# Patient Record
Sex: FEMALE | Race: WHITE | NOT HISPANIC OR LATINO | Employment: UNEMPLOYED | URBAN - METROPOLITAN AREA
[De-identification: names, ages, dates, MRNs, and addresses within clinical notes are randomized per-mention and may not be internally consistent; named-entity substitution may affect disease eponyms.]

---

## 2017-01-11 ENCOUNTER — OFFICE VISIT (OUTPATIENT)
Dept: HEMATOLOGY ONCOLOGY | Facility: MEDICAL CENTER | Age: 62
End: 2017-01-11
Payer: COMMERCIAL

## 2017-01-11 ENCOUNTER — HOSPITAL ENCOUNTER (OUTPATIENT)
Dept: RADIOLOGY | Facility: MEDICAL CENTER | Age: 62
End: 2017-01-11
Attending: INTERNAL MEDICINE
Payer: COMMERCIAL

## 2017-01-11 VITALS
RESPIRATION RATE: 16 BRPM | HEART RATE: 78 BPM | DIASTOLIC BLOOD PRESSURE: 78 MMHG | TEMPERATURE: 98.4 F | SYSTOLIC BLOOD PRESSURE: 140 MMHG | HEIGHT: 60 IN | OXYGEN SATURATION: 97 %

## 2017-01-11 DIAGNOSIS — R25.2 CRAMPS OF LEFT LOWER EXTREMITY: ICD-10-CM

## 2017-01-11 DIAGNOSIS — R23.2 HOT FLASHES: ICD-10-CM

## 2017-01-11 DIAGNOSIS — R79.89 ELEVATED LFTS: ICD-10-CM

## 2017-01-11 DIAGNOSIS — R25.2 CRAMPS OF RIGHT LOWER EXTREMITY: ICD-10-CM

## 2017-01-11 DIAGNOSIS — N60.99 ATYPICAL LOBULAR HYPERPLASIA OF BREAST: ICD-10-CM

## 2017-01-11 PROCEDURE — 99214 OFFICE O/P EST MOD 30 MIN: CPT | Performed by: INTERNAL MEDICINE

## 2017-01-11 PROCEDURE — 93971 EXTREMITY STUDY: CPT | Mod: RT

## 2017-01-11 RX ORDER — GABAPENTIN 100 MG/1
CAPSULE ORAL
Qty: 60 CAP | Refills: 1 | Status: SHIPPED | OUTPATIENT
Start: 2017-01-11 | End: 2017-09-12

## 2017-01-11 NOTE — PROGRESS NOTES
Follow Up Note:  Oncology    Date: 1/11/17    Time:  2:40 pm        Referring Physician: RANJANA Monique  Primary Care:  RANJANA Monique  Oncology: Dr. Hyatt   Surgery:     Diagnosis: left breast atypical lobular hyperplasia and flat epithelial atypia     Chief Complaint: She is here for a second opinion.    History of Presenting Illness:  Yesenia Huff is a 61 y.o. Female with left breast atypical lobular hyperplasia and flat epithelial A Blunt diagnosed secondary to abnormal mammogram. Imaging she was found to have changes in the left breast with faint calcifications as well as 2 oval mass in the posterior left breast. He was also found to have a 4 mm lesion in the right breast as well. She underwent biopsy of the calcifications in the left breast which was positive for a flat epithelial atypia with associated calcification and focal atypia lobular hyperplasia. Was originally seen by Dr. Wright who started on chemoprophylaxis with tamoxifen and 4/2016. She however was unable to tolerate tamoxifen and had multiple issues including nausea, vomiting, fatigue, hot flashes, GERD-type symptoms and increased bloating. She mentioned he stopped taking the tamoxifen in 8/2016. She had noticed improvement in her symptoms and she discontinued tamoxifen. She was then started on Arimidex in 9/2016. She appeared to have tolerated it slightly better however continue to have issues with nausea and vomiting bolus hot flashes. She had atypical symptoms such as blurred vision intermittent dizziness hair loss and increased fatigue as well as reflux symptoms. Mammogram in 11/2016 did not show any concerning findings. She established with me after a second opinion and she was changed to Aromasin to see if she would tolerate that better.    Interval history:  She is here for a follow-up visit at is accompanied by her daughter who was present in the room. He has been compliant with Aromasin since her last visit. She has been  feeling fairly stable however continues to have intermittent issues with her stomach. She states that she has early satiety as well as nausea and feels that her food is stuck in the stomach for a lung. A time. She also has intermittent issues with nausea secondary to this. This has been persistent and chronic. She denies any diarrhea and has been having intermittent constipation. She has noticed some aches and pains of her legs which have been a little worse since she started the Aromasin. Her hot lashes appears to be also getting a little worse. She however states that she couldn't tolerate this medication a low but better. He continues to have intermittent hair loss. She denies any fevers, chills or night sweats.    Past Medical History:  1. Breast typical lobular hyperplasia  2. Hypertension  3. Diabetes  4. History of hiatal hernia  5. Vitamin D deficiency  6. Insomnia  7. Hyperlipidemia  8. Arthritis  9. 2008 history of shingles        Allergies as of 01/11/2017 - Lc as Reviewed 01/11/2017   Allergen Reaction Noted   • Azithromycin  03/27/2014   • Ciprofloxacin hcl  03/29/2016   • Clindamycin  01/14/2014   • Codeine  01/14/2014   • Lyrica  01/14/2014   • Pcn [penicillins]  08/28/2013   • Valtrex [valacyclovir hcl]  01/14/2014   • Vicodin [hydrocodone-acetaminophen]  08/28/2013         Current outpatient prescriptions:   •  raloxifene (EVISTA) 60 MG Tab, Take 1 Tab by mouth every day., Disp: 30 Tab, Rfl: 3  •  anastrozole (ARIMIDEX) 1 MG Tab, Take 1 mg by mouth every day., Disp: , Rfl:   •  lisinopril (PRINIVIL) 20 MG Tab, TAKE ONE TABLET BY MOUTH AT BEDTIME, Disp: 90 Tab, Rfl: 1  •  metoprolol (LOPRESSOR) 25 MG Tab, Take 1 Tab by mouth 2 Times a Day., Disp: 60 Tab, Rfl: 11  •  vitamin D (CHOLECALCIFEROL) 1000 UNIT Tab, Take 1,000 Units by mouth every day., Disp: , Rfl:   •  omeprazole (PRILOSEC) 20 MG delayed-release capsule, Take 1 Cap by mouth 2 times a day., Disp: 180 Cap, Rfl: 3  •  dicyclomine (BENTYL)  10 MG Cap, Take 10 mg by mouth 4 Times a Day,Before Meals and at Bedtime., Disp: , Rfl:   •  lisinopril-hydrochlorothiazide (PRINZIDE, ZESTORETIC) 20-25 MG per tablet, TAKE ONE TABLET BY MOUTH DAILY, Disp: 90 Tab, Rfl: 3  •  aspirin 81 MG tablet, Take 4 Tabs by mouth every day., Disp: 100 Tab, Rfl: 3  •  ondansetron (ZOFRAN ODT) 8 MG TABLET DISPERSIBLE, Take 1 Tab by mouth every 8 hours as needed for Nausea/Vomiting., Disp: 30 Tab, Rfl: 0    Review of Systems:  All other review of systems are negative except what was mentioned above in the HPI.  Constitutional: Negative for fever, chills, and weight loss. Positive for malaise/fatigue.    HENT: Negative for ear pain and nosebleeds.     Eyes: Negative for blurred vision.    Respiratory: Positive for intermittent dry cough. Positive for intermittent shortness of breath.    Cardiovascular: Negative for chest pain and leg swelling.    Gastrointestinal: Postiive for heartburn. Positive for intermittent nausea. Negative for vomiting or abdominal pain.    Genitourinary: Negative for dysuria.    Musculoskeletal: Positive for myalgias, back pain and joint pain.    Skin: Negative for rash.    Neurological: Negative for dizziness, sensory change, focal weakness and headaches.    Endo/Heme/Allergies: No bruise/bleed easily.    Psychiatric/Behavioral: Negative for depression and memory loss.      Physical Exam:  Filed Vitals:    01/11/17 1449   BP: 140/78   Pulse: 78   Temp: 36.9 °C (98.4 °F)   Resp: 16   Height: 1.524 m (5')   SpO2: 97%       General: Not in acute distress, alert and oriented x 3  HEENT: normalcephalic, atraumatic, pupils equally reactive to light bilaterally, extra ocular muscles intact, moist oral mucus membranes, and oral cavity without any lesions.  Neck: Supple neck and full range of motion  Lymph nodes: No palpable bilateral cervical, supraclavicular, axillary or inguinal lymphadenopathy.    CVS: regular rate and rhythm, no rubs or gallops  RESP: Clear to  auscultate bilaterally.  Breast exam: Deferred   ABD: Soft, non tender, non distended, positive bowel sounds, no palpable hepatomegaly   EXT: No edema  CNS: Alert and oriented x3, cranial nerves grossly intact, muscle strength grossly intact, and normal gait.     Labs:  No visits with results within 1 Day(s) from this visit.  Latest known visit with results is:    Hospital Outpatient Visit on 12/21/2016   Component Date Value Ref Range Status   • WBC 12/21/2016 5.5  4.8 - 10.8 K/uL Final   • RBC 12/21/2016 4.46  4.20 - 5.40 M/uL Final   • Hemoglobin 12/21/2016 12.4  12.0 - 16.0 g/dL Final   • Hematocrit 12/21/2016 38.8  37.0 - 47.0 % Final   • MCV 12/21/2016 87.0  81.4 - 97.8 fL Final   • MCH 12/21/2016 27.8  27.0 - 33.0 pg Final   • MCHC 12/21/2016 32.0* 33.6 - 35.0 g/dL Final   • RDW 12/21/2016 42.3  35.9 - 50.0 fL Final   • Platelet Count 12/21/2016 174  164 - 446 K/uL Final   • MPV 12/21/2016 10.5  9.0 - 12.9 fL Final   • Neutrophils-Polys 12/21/2016 61.50  44.00 - 72.00 % Final   • Lymphocytes 12/21/2016 32.10  22.00 - 41.00 % Final   • Monocytes 12/21/2016 4.20  0.00 - 13.40 % Final   • Eosinophils 12/21/2016 1.30  0.00 - 6.90 % Final   • Basophils 12/21/2016 0.50  0.00 - 1.80 % Final   • Immature Granulocytes 12/21/2016 0.40  0.00 - 0.90 % Final   • Nucleated RBC 12/21/2016 0.00   Final   • Neutrophils (Absolute) 12/21/2016 3.38  2.00 - 7.15 K/uL Final    Includes immature neutrophils, if present.   • Lymphs (Absolute) 12/21/2016 1.76  1.00 - 4.80 K/uL Final   • Monos (Absolute) 12/21/2016 0.23  0.00 - 0.85 K/uL Final   • Eos (Absolute) 12/21/2016 0.07  0.00 - 0.51 K/uL Final   • Baso (Absolute) 12/21/2016 0.03  0.00 - 0.12 K/uL Final   • Immature Granulocytes (abs) 12/21/2016 0.02  0.00 - 0.11 K/uL Final   • NRBC (Absolute) 12/21/2016 0.00   Final   • Sodium 12/21/2016 137  135 - 145 mmol/L Final   • Potassium 12/21/2016 3.8  3.6 - 5.5 mmol/L Final   • Chloride 12/21/2016 100  96 - 112 mmol/L Final   • Co2  12/21/2016 28  20 - 33 mmol/L Final   • Anion Gap 12/21/2016 9.0  0.0 - 11.9 Final   • Glucose 12/21/2016 93  65 - 99 mg/dL Final   • Bun 12/21/2016 12  8 - 22 mg/dL Final   • Creatinine 12/21/2016 0.73  0.50 - 1.40 mg/dL Final   • Calcium 12/21/2016 10.2  8.5 - 10.5 mg/dL Final   • AST(SGOT) 12/21/2016 62* 12 - 45 U/L Final   • ALT(SGPT) 12/21/2016 48  2 - 50 U/L Final   • Alkaline Phosphatase 12/21/2016 64  30 - 99 U/L Final   • Total Bilirubin 12/21/2016 0.8  0.1 - 1.5 mg/dL Final   • Albumin 12/21/2016 4.4  3.2 - 4.9 g/dL Final   • Total Protein 12/21/2016 8.1  6.0 - 8.2 g/dL Final   • Globulin 12/21/2016 3.7* 1.9 - 3.5 g/dL Final   • A-G Ratio 12/21/2016 1.2   Final   • GFR If  12/21/2016 >60  >60 mL/min/1.73 m 2 Final   • GFR If Non  12/21/2016 >60  >60 mL/min/1.73 m 2 Final   ]    Assessment & Plan:    1. Left breast atypical lobular hyperplasia and flat epithelial atypia: He was diagnosed secondary to abnormal mammogram which showed small abnormalities in the left breast mostly in a small spot in the right breast. Biopsy was consistent with atypical lobular hyperplasia and flat epithelial atypia without any evidence of invasive malignancy or in situ malignancy. She has been on chemoprophylaxis. She was initially unable to tolerate tamoxifen. She was then started on Arimidex which she tolerated better however continued to have issues. Most recently she has been started on Aromasin which appears to be tolerating a little bit better. She continues to have some of these atypical side effects of unclear etiology. Patient states that her symptoms are fairly manageable and she would like to continue Aromasin.    2. Hot flashes: She continues to have significant issues with hot flashes which have worsened with Aromasin. I went over option of Effexor versus gabapentin. She is agreeable to gabapentin hence I will start her on low dose with slow escalation.    3. Reflux and early  satiety: On further discussion with the patient and she appears to have symptoms concerning for gastroparesis likely from her diabetes. She is due to see her primary care physician shortly. I recommend further discussion about this and possible trial of Reglan to help with her symptoms.    3. Elevated liver enzymes: She continues to have persistent mild elevation of her liver enzymes of unclear etiology. She was tested for hepatitis and was negative in the past. Patient had an ultrasound done earlier 2016. Unclear etiology for elevated liver enzymes. I recommend repeat ultrasound of the abdomen for reassessment.    4. Left calf pain: Patient has noticed intermittent calf pain mostly in the left side which is mostly at nighttime. She has also noticed episodes of tenderness and pain in the left calf. I will order an ultrasound of the left leg.    5. I will continue to monitor closely. She is to follow-up again in 2 months or sooner as needed.    She agreed and verbalized her agreement and understanding with the current plan. I answered all questions and concerns she has at this time and advised her to call at any time in the interim with questions or concerns in regards to her care. Thank you for allowing me to participate in her care, I will continue to follow.    Please note that this dictation was created using voice recognition software. I have made every reasonable attempt to correct obvious errors, but I expect that there are errors of grammar and possibly content that I did not discover before finalizing the note.

## 2017-01-11 NOTE — Clinical Note
[facility]    [address], [address]  [place] [address]        Date:1/11/2017                     Reference to Yesenia Huff    Follow Up Note:  Oncology    Date: 1/11/17    Time:  2:40 pm        Referring Physician: RANJANA Monique  Primary Care:  RANJANA Monique  Oncology: Dr. Hyatt   Surgery:     Diagnosis: left breast atypical lobular hyperplasia and flat epithelial atypia     Chief Complaint: She is here for a second opinion.    History of Presenting Illness:  Yesenia Huff is a 61 y.o. Female with left breast atypical lobular hyperplasia and flat epithelial A Blunt diagnosed secondary to abnormal mammogram. Imaging she was found to have changes in the left breast with faint calcifications as well as 2 oval mass in the posterior left breast. He was also found to have a 4 mm lesion in the right breast as well. She underwent biopsy of the calcifications in the left breast which was positive for a flat epithelial atypia with associated calcification and focal atypia lobular hyperplasia. Was originally seen by Dr. Wright who started on chemoprophylaxis with tamoxifen and 4/2016. She however was unable to tolerate tamoxifen and had multiple issues including nausea, vomiting, fatigue, hot flashes, GERD-type symptoms and increased bloating. She mentioned he stopped taking the tamoxifen in 8/2016. She had noticed improvement in her symptoms and she discontinued tamoxifen. She was then started on Arimidex in 9/2016. She appeared to have tolerated it slightly better however continue to have issues with nausea and vomiting bolus hot flashes. She had atypical symptoms such as blurred vision intermittent dizziness hair loss and increased fatigue as well as reflux symptoms. Mammogram in 11/2016 did not show any concerning findings. She established with me after a second opinion and she was changed to Aromasin to see if she would tolerate that better.    Interval history:  She  is here for a follow-up visit at is accompanied by her daughter who was present in the room. He has been compliant with Aromasin since her last visit. She has been feeling fairly stable however continues to have intermittent issues with her stomach. She states that she has early satiety as well as nausea and feels that her food is stuck in the stomach for a lung. A time. She also has intermittent issues with nausea secondary to this. This has been persistent and chronic. She denies any diarrhea and has been having intermittent constipation. She has noticed some aches and pains of her legs which have been a little worse since she started the Aromasin. Her hot lashes appears to be also getting a little worse. She however states that she couldn't tolerate this medication a low but better. He continues to have intermittent hair loss. She denies any fevers, chills or night sweats.    Past Medical History:  1. Breast typical lobular hyperplasia  2. Hypertension  3. Diabetes  4. History of hiatal hernia  5. Vitamin D deficiency  6. Insomnia  7. Hyperlipidemia  8. Arthritis  9. 2008 history of shingles        Allergies as of 01/11/2017 - Lc as Reviewed 01/11/2017   Allergen Reaction Noted   • Azithromycin  03/27/2014   • Ciprofloxacin hcl  03/29/2016   • Clindamycin  01/14/2014   • Codeine  01/14/2014   • Lyrica  01/14/2014   • Pcn [penicillins]  08/28/2013   • Valtrex [valacyclovir hcl]  01/14/2014   • Vicodin [hydrocodone-acetaminophen]  08/28/2013         Current outpatient prescriptions:   •  raloxifene (EVISTA) 60 MG Tab, Take 1 Tab by mouth every day., Disp: 30 Tab, Rfl: 3  •  anastrozole (ARIMIDEX) 1 MG Tab, Take 1 mg by mouth every day., Disp: , Rfl:   •  lisinopril (PRINIVIL) 20 MG Tab, TAKE ONE TABLET BY MOUTH AT BEDTIME, Disp: 90 Tab, Rfl: 1  •  metoprolol (LOPRESSOR) 25 MG Tab, Take 1 Tab by mouth 2 Times a Day., Disp: 60 Tab, Rfl: 11  •  vitamin D (CHOLECALCIFEROL) 1000 UNIT Tab, Take 1,000 Units by mouth  every day., Disp: , Rfl:   •  omeprazole (PRILOSEC) 20 MG delayed-release capsule, Take 1 Cap by mouth 2 times a day., Disp: 180 Cap, Rfl: 3  •  dicyclomine (BENTYL) 10 MG Cap, Take 10 mg by mouth 4 Times a Day,Before Meals and at Bedtime., Disp: , Rfl:   •  lisinopril-hydrochlorothiazide (PRINZIDE, ZESTORETIC) 20-25 MG per tablet, TAKE ONE TABLET BY MOUTH DAILY, Disp: 90 Tab, Rfl: 3  •  aspirin 81 MG tablet, Take 4 Tabs by mouth every day., Disp: 100 Tab, Rfl: 3  •  ondansetron (ZOFRAN ODT) 8 MG TABLET DISPERSIBLE, Take 1 Tab by mouth every 8 hours as needed for Nausea/Vomiting., Disp: 30 Tab, Rfl: 0    Review of Systems:  All other review of systems are negative except what was mentioned above in the HPI.  Constitutional: Negative for fever, chills, and weight loss. Positive for malaise/fatigue.    HENT: Negative for ear pain and nosebleeds.     Eyes: Negative for blurred vision.    Respiratory: Positive for intermittent dry cough. Positive for intermittent shortness of breath.    Cardiovascular: Negative for chest pain and leg swelling.    Gastrointestinal: Postiive for heartburn. Positive for intermittent nausea. Negative for vomiting or abdominal pain.    Genitourinary: Negative for dysuria.    Musculoskeletal: Positive for myalgias, back pain and joint pain.    Skin: Negative for rash.    Neurological: Negative for dizziness, sensory change, focal weakness and headaches.    Endo/Heme/Allergies: No bruise/bleed easily.    Psychiatric/Behavioral: Negative for depression and memory loss.      Physical Exam:  Filed Vitals:    01/11/17 1449   BP: 140/78   Pulse: 78   Temp: 36.9 °C (98.4 °F)   Resp: 16   Height: 1.524 m (5')   SpO2: 97%       General: Not in acute distress, alert and oriented x 3  HEENT: normalcephalic, atraumatic, pupils equally reactive to light bilaterally, extra ocular muscles intact, moist oral mucus membranes, and oral cavity without any lesions.  Neck: Supple neck and full range of  motion  Lymph nodes: No palpable bilateral cervical, supraclavicular, axillary or inguinal lymphadenopathy.    CVS: regular rate and rhythm, no rubs or gallops  RESP: Clear to auscultate bilaterally.  Breast exam: Deferred   ABD: Soft, non tender, non distended, positive bowel sounds, no palpable hepatomegaly   EXT: No edema  CNS: Alert and oriented x3, cranial nerves grossly intact, muscle strength grossly intact, and normal gait.     Labs:  No visits with results within 1 Day(s) from this visit.  Latest known visit with results is:    Hospital Outpatient Visit on 12/21/2016   Component Date Value Ref Range Status   • WBC 12/21/2016 5.5  4.8 - 10.8 K/uL Final   • RBC 12/21/2016 4.46  4.20 - 5.40 M/uL Final   • Hemoglobin 12/21/2016 12.4  12.0 - 16.0 g/dL Final   • Hematocrit 12/21/2016 38.8  37.0 - 47.0 % Final   • MCV 12/21/2016 87.0  81.4 - 97.8 fL Final   • MCH 12/21/2016 27.8  27.0 - 33.0 pg Final   • MCHC 12/21/2016 32.0* 33.6 - 35.0 g/dL Final   • RDW 12/21/2016 42.3  35.9 - 50.0 fL Final   • Platelet Count 12/21/2016 174  164 - 446 K/uL Final   • MPV 12/21/2016 10.5  9.0 - 12.9 fL Final   • Neutrophils-Polys 12/21/2016 61.50  44.00 - 72.00 % Final   • Lymphocytes 12/21/2016 32.10  22.00 - 41.00 % Final   • Monocytes 12/21/2016 4.20  0.00 - 13.40 % Final   • Eosinophils 12/21/2016 1.30  0.00 - 6.90 % Final   • Basophils 12/21/2016 0.50  0.00 - 1.80 % Final   • Immature Granulocytes 12/21/2016 0.40  0.00 - 0.90 % Final   • Nucleated RBC 12/21/2016 0.00   Final   • Neutrophils (Absolute) 12/21/2016 3.38  2.00 - 7.15 K/uL Final    Includes immature neutrophils, if present.   • Lymphs (Absolute) 12/21/2016 1.76  1.00 - 4.80 K/uL Final   • Monos (Absolute) 12/21/2016 0.23  0.00 - 0.85 K/uL Final   • Eos (Absolute) 12/21/2016 0.07  0.00 - 0.51 K/uL Final   • Baso (Absolute) 12/21/2016 0.03  0.00 - 0.12 K/uL Final   • Immature Granulocytes (abs) 12/21/2016 0.02  0.00 - 0.11 K/uL Final   • NRBC (Absolute) 12/21/2016  0.00   Final   • Sodium 12/21/2016 137  135 - 145 mmol/L Final   • Potassium 12/21/2016 3.8  3.6 - 5.5 mmol/L Final   • Chloride 12/21/2016 100  96 - 112 mmol/L Final   • Co2 12/21/2016 28  20 - 33 mmol/L Final   • Anion Gap 12/21/2016 9.0  0.0 - 11.9 Final   • Glucose 12/21/2016 93  65 - 99 mg/dL Final   • Bun 12/21/2016 12  8 - 22 mg/dL Final   • Creatinine 12/21/2016 0.73  0.50 - 1.40 mg/dL Final   • Calcium 12/21/2016 10.2  8.5 - 10.5 mg/dL Final   • AST(SGOT) 12/21/2016 62* 12 - 45 U/L Final   • ALT(SGPT) 12/21/2016 48  2 - 50 U/L Final   • Alkaline Phosphatase 12/21/2016 64  30 - 99 U/L Final   • Total Bilirubin 12/21/2016 0.8  0.1 - 1.5 mg/dL Final   • Albumin 12/21/2016 4.4  3.2 - 4.9 g/dL Final   • Total Protein 12/21/2016 8.1  6.0 - 8.2 g/dL Final   • Globulin 12/21/2016 3.7* 1.9 - 3.5 g/dL Final   • A-G Ratio 12/21/2016 1.2   Final   • GFR If  12/21/2016 >60  >60 mL/min/1.73 m 2 Final   • GFR If Non  12/21/2016 >60  >60 mL/min/1.73 m 2 Final   ]    Assessment & Plan:    1. Left breast atypical lobular hyperplasia and flat epithelial atypia: He was diagnosed secondary to abnormal mammogram which showed small abnormalities in the left breast mostly in a small spot in the right breast. Biopsy was consistent with atypical lobular hyperplasia and flat epithelial atypia without any evidence of invasive malignancy or in situ malignancy. She has been on chemoprophylaxis. She was initially unable to tolerate tamoxifen. She was then started on Arimidex which she tolerated better however continued to have issues. Most recently she has been started on Aromasin which appears to be tolerating a little bit better. She continues to have some of these atypical side effects of unclear etiology. Patient states that her symptoms are fairly manageable and she would like to continue Aromasin.    2. Hot flashes: She continues to have significant issues with hot flashes which have worsened with  Aromasin. I went over option of Effexor versus gabapentin. She is agreeable to gabapentin hence I will start her on low dose with slow escalation.    3. Reflux and early satiety: On further discussion with the patient and she appears to have symptoms concerning for gastroparesis likely from her diabetes. She is due to see her primary care physician shortly. I recommend further discussion about this and possible trial of Reglan to help with her symptoms.    3. Elevated liver enzymes: She continues to have persistent mild elevation of her liver enzymes of unclear etiology. She was tested for hepatitis and was negative in the past. Patient had an ultrasound done earlier 2016. Unclear etiology for elevated liver enzymes. I recommend repeat ultrasound of the abdomen for reassessment.    4. Left calf pain: Patient has noticed intermittent calf pain mostly in the left side which is mostly at nighttime. She has also noticed episodes of tenderness and pain in the left calf. I will order an ultrasound of the left leg.    5. I will continue to monitor closely. She is to follow-up again in 2 months or sooner as needed.    She agreed and verbalized her agreement and understanding with the current plan. I answered all questions and concerns she has at this time and advised her to call at any time in the interim with questions or concerns in regards to her care. Thank you for allowing me to participate in her care, I will continue to follow.    Please note that this dictation was created using voice recognition software. I have made every reasonable attempt to correct obvious errors, but I expect that there are errors of grammar and possibly content that I did not discover before finalizing the note.            Sincerely,  Ailyn Cruz  [address] [place] [address], [address]   [phone]

## 2017-01-11 NOTE — MR AVS SNAPSHOT
Yesenia Huff   2017 2:40 PM   Office Visit   MRN: [id number]    Department:  [facility]   Dept Phone:  [phone]    Description:  Female : 1955   Provider:  Ailyn Cruz M.D.           Reason for Visit     Follow-Up 4 week      Allergies as of 2017     Allergen Noted Reactions    Azithromycin 2014       Heart racing    Ciprofloxacin Hcl 2016       Clindamycin 2014       Codeine 2014       Lyrica 2014       Pcn [Penicillins] 2013       Valtrex [Valacyclovir Hcl] 2014       Vicodin [Hydrocodone-Acetaminophen] 2013         You were diagnosed with     Cramps of left lower extremity   [5539283]       Hot flashes   [951726]       Elevated LFTs   [484511]         Vital Signs     Blood Pressure Pulse Temperature Respirations Height       140/78 mmHg 78 36.9 °C (98.4 °F) 16 1.524 m (5')     Oxygen Saturation Smoking Status                97% Former Smoker          Basic Information     Date Of Birth Sex Race Ethnicity Preferred Language    1955 Female White Non- English      Your appointments     2017  5:00 PM   US EXTREMITY (30) A with VISTA [address]   [facility] ([place])    [address] [facility] [id number] [phone]            2017  3:40 PM   Established Patient with RANJANA Monique   [facility] (--)    [address] [id number] [phone]           You will be receiving a confirmation call a few days before your appointment from our automated call confirmation system.            Mar 08, 2017 10:40 AM   ONCOLOGY EST PATIENT 30 MIN with Ailyn Cruz M.D.   [facility] (--)    [address] [facility], [address]  [facility] [id number] [phone]            2017  9:20 AM   Established Patient with RANJANA Monique   [facility] (--)    [address] [id number] [phone]          You will be receiving a confirmation call a few days before your appointment from our automated call confirmation system.              Problem List              ICD-10-CM Priority Class Noted - Resolved    HTN (hypertension) I10   10/26/2010 - Present    Tobacco abuse Z72.0   10/22/2013 - Present    Atypical chest pain R07.89   3/27/2014 - Present    Dyspnea R06.00   3/27/2014 - Present    Gastroesophageal reflux disease without esophagitis K21.9   10/29/2015 - Present    IBS (irritable bowel syndrome) K58.9   10/29/2015 - Present    Hiatal hernia K44.9   10/29/2015 - Present    Hyperlipidemia E78.5   10/29/2015 - Present    Elevated liver enzymes R74.8   11/18/2015 - Present    Vitamin D deficiency E55.9   11/18/2015 - Present    Scalp cyst L72.9   2/18/2016 - Present    Ankle pain M25.579   2/18/2016 - Present    Skin lesion L98.9   2/18/2016 - Present    Trigger finger of right hand M65.30   3/16/2016 - Present    Tendinitis M77.9   3/16/2016 - Present    Abnormal mammogram R92.8   3/16/2016 - Present    Depression F32.9   5/19/2016 - Present    Malignant neoplasm of left female breast (HCC) C50.912   10/4/2016 - Present    Elevated TSH R94.6   10/4/2016 - Present    Lobular hyperplasia, atypical, breast N62   12/21/2016 - Present      Health Maintenance        Date Due Completion Dates    IMM DTaP/Tdap/Td Vaccine (1 - Tdap) 4/23/1976 ---    PAP SMEAR 8/28/2016 8/28/2013 (Declined)    Override on 8/28/2013: Patient Declined    IMM INFLUENZA (1) 9/1/2016 10/22/2013    MAMMOGRAM 3/9/2017 3/9/2016    COLONOSCOPY 8/28/2023 8/28/2013 (Declined)    Override on 8/28/2013: Patient Declined            Current Immunizations     Influenza TIV (IM) 10/22/2013    Pneumococcal polysaccharide vaccine (PPSV-23) 3/27/2014 10:23 PM      Below and/or attached are the medications your provider expects you to take. Review all of your home medications and newly ordered medications with your provider and/or pharmacist. Follow  medication instructions as directed by your provider and/or pharmacist. Please keep your medication list with you and share with your provider. Update the information when medications are discontinued, doses are changed, or new medications (including over-the-counter products) are added; and carry medication information at all times in the event of emergency situations     Allergies:  AZITHROMYCIN - (reactions not documented)     CIPROFLOXACIN HCL - (reactions not documented)     CLINDAMYCIN - (reactions not documented)     CODEINE - (reactions not documented)     LYRICA - (reactions not documented)     PCN - (reactions not documented)     VALTREX - (reactions not documented)     VICODIN - (reactions not documented)               Medications  Valid as of: January 11, 2017 -  3:45 PM    Generic Name Brand Name Tablet Size Instructions for use    Aspirin (Tab) aspirin 81 MG Take 4 Tabs by mouth every day.        Cholecalciferol (Tab) cholecalciferol 1000 UNIT Take 1,000 Units by mouth every day.        Dicyclomine HCl (Cap) BENTYL 10 MG Take 10 mg by mouth 4 Times a Day,Before Meals and at Bedtime.        Gabapentin (Cap) NEURONTIN 100 MG Take one tab qhs x 2 days, then 2 tabs qhs and then 3 tab qhs        Lisinopril (Tab) PRINIVIL 20 MG TAKE ONE TABLET BY MOUTH AT BEDTIME        Lisinopril-Hydrochlorothiazide (Tab) PRINZIDE, ZESTORETIC 20-25 MG TAKE ONE TABLET BY MOUTH DAILY        Metoprolol Tartrate (Tab) LOPRESSOR 25 MG Take 1 Tab by mouth 2 Times a Day.        Omeprazole (CAPSULE DELAYED RELEASE) PRILOSEC 20 MG Take 1 Cap by mouth 2 times a day.        Ondansetron (TABLET DISPERSIBLE) ZOFRAN ODT 8 MG Take 1 Tab by mouth every 8 hours as needed for Nausea/Vomiting.        Raloxifene HCl (Tab) EVISTA 60 MG Take 1 Tab by mouth every day.        .                 Medicines prescribed today were sent to:     [facility][id number] - [place] - [address]    [address] [place] [address]    Phone: [phone]  Fax: [phone]    Open 24 Hours?: No      Medication refill instructions:       If your prescription bottle indicates you have medication refills left, it is not necessary to call your provider’s office. Please contact your pharmacy and they will refill your medication.    If your prescription bottle indicates you do not have any refills left, you may request refills at any time through one of the following ways: The online [employer] system (except Urgent Care), by calling your provider’s office, or by asking your pharmacy to contact your provider’s office with a refill request. Medication refills are processed only during regular business hours and may not be available until the next business day. Your provider may request additional information or to have a follow-up visit with you prior to refilling your medication.   *Please Note: Medication refills are assigned a new Rx number when refilled electronically. Your pharmacy may indicate that no refills were authorized even though a new prescription for the same medication is available at the pharmacy. Please request the medicine by name with the pharmacy before contacting your provider for a refill.        Your To Do List     Future Labs/Procedures Complete By Expires    US-ABDOMEN COMPLETE SURVEY  As directed 1/11/2018         [employer] Access Code: Activation code not generated  Current [employer] Status: Active

## 2017-01-12 ENCOUNTER — OFFICE VISIT (OUTPATIENT)
Dept: MEDICAL GROUP | Facility: CLINIC | Age: 62
End: 2017-01-12
Payer: COMMERCIAL

## 2017-01-12 VITALS
HEIGHT: 60 IN | OXYGEN SATURATION: 97 % | RESPIRATION RATE: 16 BRPM | SYSTOLIC BLOOD PRESSURE: 122 MMHG | HEART RATE: 82 BPM | DIASTOLIC BLOOD PRESSURE: 80 MMHG | TEMPERATURE: 97.9 F

## 2017-01-12 DIAGNOSIS — I10 ESSENTIAL HYPERTENSION: ICD-10-CM

## 2017-01-12 DIAGNOSIS — K31.84 GASTROPARESIS: ICD-10-CM

## 2017-01-12 PROCEDURE — 99214 OFFICE O/P EST MOD 30 MIN: CPT | Performed by: NURSE PRACTITIONER

## 2017-01-12 RX ORDER — METOCLOPRAMIDE 5 MG/1
5 TABLET ORAL
Qty: 120 TAB | Refills: 2 | Status: SHIPPED | OUTPATIENT
Start: 2017-01-12 | End: 2018-06-01

## 2017-01-12 NOTE — ASSESSMENT & PLAN NOTE
This is a chronic problem that is controlled with her current medications. Patient denies any headache, dizziness, chest pain, shortness of breath, or lower extremity edema.

## 2017-01-12 NOTE — MR AVS SNAPSHOT
Yesenia Huff   2017 3:40 PM   Office Visit   MRN: [id number]    [facility] Phone:  [phone]    Description:  Female : 1955   Provider:  RANJANA Monique           Reason for Visit     Follow-Up 3 months    Results labs      Allergies as of 2017     Allergen Noted Reactions    Azithromycin 2014       Heart racing    Ciprofloxacin Hcl 2016       Clindamycin 2014       Codeine 2014       Lyrica 2014       Pcn [Penicillins] 2013       Valtrex [Valacyclovir Hcl] 2014       Vicodin [Hydrocodone-Acetaminophen] 2013         You were diagnosed with     Gastroparesis   [536.3.ICD-9-CM]         Vital Signs     Blood Pressure Pulse Temperature Respirations Height       122/80 mmHg 82 36.6 °C (97.9 °F) 16 1.524 m (5')     Oxygen Saturation Smoking Status                97% Former Smoker          Basic Information     Date Of Birth Sex Race Ethnicity Preferred Language    1955 Female White Non- English      Your appointments     2017  3:40 PM   Established Patient with RANJANA Monique   [facility] (--)    [address] [place] [id number] [phone]           You will be receiving a confirmation call a few days before your appointment from our automated call confirmation system.            Mar 08, 2017 10:40 AM   ONCOLOGY EST PATIENT 30 MIN with Ailyn Cruz M.D.   [facility] (--)    [address] [place] [phone]   [phone]            2017  9:20 AM   Established Patient with RANJANA Monique   [facility] (--)    [address] [place] [id number] [phone]           You will be receiving a confirmation call a few days before your appointment from our automated call confirmation system.              Problem List              ICD-10-CM Priority Class Noted -  Resolved    HTN (hypertension) I10   10/26/2010 - Present    Tobacco abuse Z72.0   10/22/2013 - Present    Atypical chest pain R07.89   3/27/2014 - Present    Dyspnea R06.00   3/27/2014 - Present    Gastroesophageal reflux disease without esophagitis K21.9   10/29/2015 - Present    IBS (irritable bowel syndrome) K58.9   10/29/2015 - Present    Hiatal hernia K44.9   10/29/2015 - Present    Hyperlipidemia E78.5   10/29/2015 - Present    Elevated liver enzymes R74.8   11/18/2015 - Present    Vitamin D deficiency E55.9   11/18/2015 - Present    Scalp cyst L72.9   2/18/2016 - Present    Ankle pain M25.579   2/18/2016 - Present    Skin lesion L98.9   2/18/2016 - Present    Trigger finger of right hand M65.30   3/16/2016 - Present    Tendinitis M77.9   3/16/2016 - Present    Abnormal mammogram R92.8   3/16/2016 - Present    Depression F32.9   5/19/2016 - Present    Elevated TSH R94.6   10/4/2016 - Present    Lobular hyperplasia, atypical, breast N62   12/21/2016 - Present    Atypical lobular hyperplasia of breast N62   1/11/2017 - Present    Gastroparesis K31.84   1/12/2017 - Present      Health Maintenance        Date Due Completion Dates    IMM DTaP/Tdap/Td Vaccine (1 - Tdap) 4/23/1976 ---    PAP SMEAR 8/28/2016 8/28/2013 (Declined)    Override on 8/28/2013: Patient Declined    IMM INFLUENZA (1) 9/1/2016 10/22/2013    MAMMOGRAM 3/9/2017 3/9/2016    COLONOSCOPY 8/28/2023 8/28/2013 (Declined)    Override on 8/28/2013: Patient Declined            Current Immunizations     Influenza TIV (IM) 10/22/2013    Pneumococcal polysaccharide vaccine (PPSV-23) 3/27/2014 10:23 PM      Below and/or attached are the medications your provider expects you to take. Review all of your home medications and newly ordered medications with your provider and/or pharmacist. Follow medication instructions as directed by your provider and/or pharmacist. Please keep your medication list with you and share with your provider. Update the information  when medications are discontinued, doses are changed, or new medications (including over-the-counter products) are added; and carry medication information at all times in the event of emergency situations     Allergies:  AZITHROMYCIN - (reactions not documented)     CIPROFLOXACIN HCL - (reactions not documented)     CLINDAMYCIN - (reactions not documented)     CODEINE - (reactions not documented)     LYRICA - (reactions not documented)     PCN - (reactions not documented)     VALTREX - (reactions not documented)     VICODIN - (reactions not documented)               Medications  Valid as of: January 12, 2017 - 12:14 PM    Generic Name Brand Name Tablet Size Instructions for use    Aspirin (Tab) aspirin 81 MG Take 4 Tabs by mouth every day.        Calcium Carbonate Antacid   Take  by mouth.        Cholecalciferol (Tab) cholecalciferol 1000 UNIT Take 1,000 Units by mouth every day.        Dicyclomine HCl (Cap) BENTYL 10 MG Take 10 mg by mouth 4 Times a Day,Before Meals and at Bedtime.        DiphenhydrAMINE HCl (Sleep)   Take  by mouth.        Gabapentin (Cap) NEURONTIN 100 MG Take one tab qhs x 2 days, then 2 tabs qhs and then 3 tab qhs        Lisinopril (Tab) PRINIVIL 20 MG TAKE ONE TABLET BY MOUTH AT BEDTIME        Lisinopril-Hydrochlorothiazide (Tab) PRINZIDE, ZESTORETIC 20-25 MG TAKE ONE TABLET BY MOUTH DAILY        Metoclopramide HCl (Tab) REGLAN 5 MG Take 1 Tab by mouth 3 times a day before meals.        Metoprolol Tartrate (Tab) LOPRESSOR 25 MG Take 1 Tab by mouth 2 Times a Day.        Omeprazole (CAPSULE DELAYED RELEASE) PRILOSEC 20 MG Take 1 Cap by mouth 2 times a day.        Ondansetron (TABLET DISPERSIBLE) ZOFRAN ODT 8 MG Take 1 Tab by mouth every 8 hours as needed for Nausea/Vomiting.        Raloxifene HCl (Tab) EVISTA 60 MG Take 1 Tab by mouth every day.        .                 Medicines prescribed today were sent to:     [facility] #[id number] - [place] - [address]    [address] [place]  [address]    Phone: [phone] Fax: [phone]    Open 24 Hours?: No      Medication refill instructions:       If your prescription bottle indicates you have medication refills left, it is not necessary to call your provider’s office. Please contact your pharmacy and they will refill your medication.    If your prescription bottle indicates you do not have any refills left, you may request refills at any time through one of the following ways: The online [employer] system (except Urgent Care), by calling your provider’s office, or by asking your pharmacy to contact your provider’s office with a refill request. Medication refills are processed only during regular business hours and may not be available until the next business day. Your provider may request additional information or to have a follow-up visit with you prior to refilling your medication.   *Please Note: Medication refills are assigned a new Rx number when refilled electronically. Your pharmacy may indicate that no refills were authorized even though a new prescription for the same medication is available at the pharmacy. Please request the medicine by name with the pharmacy before contacting your provider for a refill.           [employer] Access Code: Activation code not generated  Current [employer] Status: Active

## 2017-01-12 NOTE — PROGRESS NOTES
"  Chief Complaint   Patient presents with   • Follow-Up     3 months   • Results     labs       HISTORY OF PRESENT ILLNESS: Patient is a 61 y.o. female established patient who presents today to review her recent lab results and to discuss her health concerns as outlined below.      Gastroparesis  This is a new problem. Patient has been experiencing nausea and feelings of \"fullness\" after she eats. She occasionally uses Zofran for her nausea and states this medication works well. I will start her on Reglan to see if this helps with her symptoms. Patient was instructed to eat small portions and to monitor for worsening symptoms such as fever or intense abdominal pain.    HTN (hypertension)  This is a chronic problem that is controlled with her current medications. Patient denies any headache, dizziness, chest pain, shortness of breath, or lower extremity edema.        Patient Active Problem List    Diagnosis Date Noted   • Gastroparesis 01/12/2017   • Atypical lobular hyperplasia of breast 01/11/2017   • Lobular hyperplasia, atypical, breast 12/21/2016   • Elevated TSH 10/04/2016   • Depression 05/19/2016   • Trigger finger of right hand 03/16/2016   • Tendinitis 03/16/2016   • Abnormal mammogram 03/16/2016   • Scalp cyst 02/18/2016   • Ankle pain 02/18/2016   • Skin lesion 02/18/2016   • Elevated liver enzymes 11/18/2015   • Vitamin D deficiency 11/18/2015   • Gastroesophageal reflux disease without esophagitis 10/29/2015   • IBS (irritable bowel syndrome) 10/29/2015   • Hiatal hernia 10/29/2015   • Hyperlipidemia 10/29/2015   • Atypical chest pain 03/27/2014   • Dyspnea 03/27/2014   • HTN (hypertension) 10/26/2010       Allergies:Azithromycin; Ciprofloxacin hcl; Clindamycin; Codeine; Lyrica; Pcn; Valtrex; and Vicodin    Current Outpatient Prescriptions   Medication Sig Dispense Refill   • DiphenhydrAMINE HCl, Sleep, (UNISOM SLEEPGELS PO) Take  by mouth.     • Calcium Carbonate Antacid (DILAN-SELTZER ANTACID PO) Take  " by mouth.     • metoclopramide (REGLAN) 5 MG tablet Take 1 Tab by mouth 3 times a day before meals. 120 Tab 2   • raloxifene (EVISTA) 60 MG Tab Take 1 Tab by mouth every day. 30 Tab 3   • lisinopril (PRINIVIL) 20 MG Tab TAKE ONE TABLET BY MOUTH AT BEDTIME 90 Tab 1   • metoprolol (LOPRESSOR) 25 MG Tab Take 1 Tab by mouth 2 Times a Day. 60 Tab 11   • vitamin D (CHOLECALCIFEROL) 1000 UNIT Tab Take 1,000 Units by mouth every day.     • omeprazole (PRILOSEC) 20 MG delayed-release capsule Take 1 Cap by mouth 2 times a day. 180 Cap 3   • lisinopril-hydrochlorothiazide (PRINZIDE, ZESTORETIC) 20-25 MG per tablet TAKE ONE TABLET BY MOUTH DAILY 90 Tab 3   • aspirin 81 MG tablet Take 4 Tabs by mouth every day. 100 Tab 3   • gabapentin (NEURONTIN) 100 MG Cap Take one tab qhs x 2 days, then 2 tabs qhs and then 3 tab qhs 60 Cap 1   • ondansetron (ZOFRAN ODT) 8 MG TABLET DISPERSIBLE Take 1 Tab by mouth every 8 hours as needed for Nausea/Vomiting. 30 Tab 0   • dicyclomine (BENTYL) 10 MG Cap Take 10 mg by mouth 4 Times a Day,Before Meals and at Bedtime.       No current facility-administered medications for this visit.       Reviewed today: Past medical history, social history, and family history. Updated as needed.    Social History     Social History Narrative       ROS:  Review of Systems   Constitutional: Negative for fever, lethargy and unexplained weight loss.   Respiratory: Negative for cough, wheezing and SOB.   Cardiovascular: Negative for chest pain, dizziness, and leg swelling.    Gastrointestinal: Negative for nausea, vomiting, and diarrhea.   Psych: Negative for anxiety and depression.    All other systems reviewed and are negative except as in HPI.      Exam:  Blood pressure 122/80, pulse 82, temperature 36.6 °C (97.9 °F), resp. rate 16, height 1.524 m (5'), SpO2 97 %.  General:  Well nourished, well developed female in NAD  Head: normocephalic, atraumatic  Eyes: EOM within normal limits, no conjunctival injection, no  scleral icterus  Nose: symmetrical, no discharge.  Neck: no masses, range of motion within normal limits, no tracheal deviation. No obvious thyroid enlargement. No adenopathy.   Pulmonary: chest is symmetrical with respiration, no wheezes, crackles, or rhonchi. Normal effort.   Cardiovascular: regular rate and rhythm without murmurs, rubs, or gallops.  Abdomen: Round, soft, nontender. Bowel sounds present.  Musculoskeletal: Normal gait, grossly normal muscle tone.  Extremities: no clubbing, cyanosis, or edema.  Psych: appropriate mood, affect, judgement.   Skin: Pink, warm, dry.      Please note that this dictation was created using voice recognition software. I have made every reasonable attempt to correct obvious errors, but I expect that there are errors of grammar and possibly content that I did not discover before finalizing the note.    Assessment/Plan:  1. Gastroparesis  metoclopramide (REGLAN) 5 MG tablet    Uncontrolled. Make dietary changes. Start Reglan. Follow-up in 3 months or sooner if needed.   2. Essential hypertension      Controlled. Continue current medications. Follow-up in 3-6 months.

## 2017-01-12 NOTE — ASSESSMENT & PLAN NOTE
"This is a new problem. Patient has been experiencing nausea and feelings of \"fullness\" after she eats. She occasionally uses Zofran for her nausea and states this medication works well. I will start her on Reglan to see if this helps with her symptoms. Patient was instructed to eat small portions and to monitor for worsening symptoms such as fever or intense abdominal pain.  "

## 2017-01-18 ENCOUNTER — TELEPHONE (OUTPATIENT)
Dept: MEDICAL GROUP | Facility: CLINIC | Age: 62
End: 2017-01-18

## 2017-01-18 NOTE — TELEPHONE ENCOUNTER
1. Caller Name: Yesenia                                         Call Back Number: [phone]        Patient approves a detailed voicemail message: yes    pt called and wants to know if she can increase her reglan due to sometimes  it doesnt seem to work, depends on what she eats. Please advise

## 2017-01-19 NOTE — TELEPHONE ENCOUNTER
Yes, she can take up to 10 mg 4 times daily if needed for symptoms. She will need to request a dose increase if she is going to take 10 mg on a regular basis.

## 2017-01-20 ENCOUNTER — TELEPHONE (OUTPATIENT)
Dept: HEMATOLOGY ONCOLOGY | Facility: MEDICAL CENTER | Age: 62
End: 2017-01-20

## 2017-01-20 NOTE — TELEPHONE ENCOUNTER
Pt called to report c/o itching around scalp area and back of neck since starting Gabapentin.  She has has relief of hot flashes, and is wondering if she may cut back on dose to see if that helps.  Per GAIL Camacho, pt may cut back to 200 mg from 300 mg of gabapentin.  She may also take medication in the morning, since she has noticed trouble sleeping since starting the medication as well.  Reviewed signs of severe allergic reaction with patient including hives, trouble breathing, and feeling of airway closing.  She understands to report to emergency room if a severe reaction occurs.  Pt verbalizes understanding.

## 2017-02-01 RX ORDER — LISINOPRIL AND HYDROCHLOROTHIAZIDE 25; 20 MG/1; MG/1
TABLET ORAL
Qty: 90 TAB | Refills: 2 | Status: SHIPPED | OUTPATIENT
Start: 2017-02-01 | End: 2017-12-13 | Stop reason: SDUPTHER

## 2017-02-03 DIAGNOSIS — I10 ESSENTIAL HYPERTENSION: ICD-10-CM

## 2017-02-24 RX ORDER — OMEPRAZOLE 20 MG/1
CAPSULE, DELAYED RELEASE ORAL
Qty: 180 CAP | Refills: 2 | Status: SHIPPED | OUTPATIENT
Start: 2017-02-24 | End: 2017-12-13 | Stop reason: SDUPTHER

## 2017-02-27 ENCOUNTER — OFFICE VISIT (OUTPATIENT)
Dept: URGENT CARE | Facility: PHYSICIAN GROUP | Age: 62
End: 2017-02-27
Payer: COMMERCIAL

## 2017-02-27 VITALS
RESPIRATION RATE: 14 BRPM | OXYGEN SATURATION: 97 % | TEMPERATURE: 98.6 F | SYSTOLIC BLOOD PRESSURE: 116 MMHG | HEIGHT: 60 IN | DIASTOLIC BLOOD PRESSURE: 80 MMHG | HEART RATE: 88 BPM

## 2017-02-27 DIAGNOSIS — J01.00 ACUTE MAXILLARY SINUSITIS, RECURRENCE NOT SPECIFIED: ICD-10-CM

## 2017-02-27 DIAGNOSIS — J22 LOWER RESP. TRACT INFECTION: ICD-10-CM

## 2017-02-27 PROCEDURE — 99214 OFFICE O/P EST MOD 30 MIN: CPT | Performed by: PHYSICIAN ASSISTANT

## 2017-02-27 RX ORDER — BENZONATATE 200 MG/1
200 CAPSULE ORAL 3 TIMES DAILY PRN
Qty: 30 CAP | Refills: 0 | Status: SHIPPED | OUTPATIENT
Start: 2017-02-27 | End: 2017-09-12

## 2017-02-27 RX ORDER — DOXYCYCLINE HYCLATE 100 MG
100 TABLET ORAL 2 TIMES DAILY
Qty: 20 TAB | Refills: 0 | Status: SHIPPED | OUTPATIENT
Start: 2017-02-27 | End: 2017-09-12

## 2017-02-27 RX ORDER — FLUTICASONE PROPIONATE 50 MCG
1 SPRAY, SUSPENSION (ML) NASAL 2 TIMES DAILY
Qty: 1 BOTTLE | Refills: 0 | Status: SHIPPED | OUTPATIENT
Start: 2017-02-27 | End: 2017-09-12

## 2017-02-27 RX ORDER — CODEINE PHOSPHATE AND GUAIFENESIN 10; 100 MG/5ML; MG/5ML
5 SOLUTION ORAL NIGHTLY PRN
Qty: 120 ML | Refills: 0 | Status: SHIPPED | OUTPATIENT
Start: 2017-02-27 | End: 2017-09-12

## 2017-02-27 NOTE — MR AVS SNAPSHOT
Yesenia Huff   2017 1:45 PM   Office Visit   MRN: [id number]    Department:  [facility]   Dept Phone:  [phone]    Description:  Female : 1955   Provider:  Arabella Moulton PA-C           Reason for Visit     Sinus Problem also cough / nonproductive      Allergies as of 2017     Allergen Noted Reactions    Azithromycin 2014       Heart racing    Ciprofloxacin Hcl 2016       Clindamycin 2014       Lyrica 2014       Pcn [Penicillins] 2013       Valtrex [Valacyclovir Hcl] 2014       Vicodin [Hydrocodone-Acetaminophen] 2013         You were diagnosed with     Acute maxillary sinusitis, recurrence not specified   [6154880]       Lower resp. tract infection   [764610]         Vital Signs     Blood Pressure Pulse Temperature Respirations Height       116/80 mmHg 88 37 °C (98.6 °F) 14 1.524 m (5')     Oxygen Saturation Smoking Status                97% Former Smoker          Basic Information     Date Of Birth Sex Race Ethnicity Preferred Language    1955 Female White Non- English      Your appointments     Mar 08, 2017 10:40 AM   ONCOLOGY EST PATIENT 30 MIN with Ailyn Cruz M.D.   [facility] (--)    [address] [place] [address], [facility] [address]  [facility] [id number] [phone]            2017  9:20 AM   Established Patient with RANJANA Monique   [facility] (--)    [facility] [id number] [phone]           You will be receiving a confirmation call a few days before your appointment from our automated call confirmation system.              Problem List              ICD-10-CM Priority Class Noted - Resolved    HTN (hypertension) I10   10/26/2010 - Present    Atypical chest pain R07.89   3/27/2014 - Present    Dyspnea R06.00   3/27/2014 - Present    Gastroesophageal reflux disease without esophagitis K21.9   10/29/2015 - Present    IBS (irritable bowel syndrome)  K58.9   10/29/2015 - Present    Hiatal hernia K44.9   10/29/2015 - Present    Hyperlipidemia E78.5   10/29/2015 - Present    Elevated liver enzymes R74.8   11/18/2015 - Present    Vitamin D deficiency E55.9   11/18/2015 - Present    Scalp cyst L72.9   2/18/2016 - Present    Ankle pain M25.579   2/18/2016 - Present    Skin lesion L98.9   2/18/2016 - Present    Trigger finger of right hand M65.30   3/16/2016 - Present    Tendinitis M77.9   3/16/2016 - Present    Abnormal mammogram R92.8   3/16/2016 - Present    Depression F32.9   5/19/2016 - Present    Elevated TSH R94.6   10/4/2016 - Present    Lobular hyperplasia, atypical, breast N62   12/21/2016 - Present    Atypical lobular hyperplasia of breast N62   1/11/2017 - Present    Gastroparesis K31.84   1/12/2017 - Present      Health Maintenance        Date Due Completion Dates    IMM DTaP/Tdap/Td Vaccine (1 - Tdap) 4/23/1976 ---    PAP SMEAR 8/28/2016 8/28/2013 (Declined)    Override on 8/28/2013: Patient Declined    IMM INFLUENZA (1) 9/1/2016 10/22/2013    MAMMOGRAM 3/9/2017 3/9/2016    COLONOSCOPY 8/28/2023 8/28/2013 (Declined)    Override on 8/28/2013: Patient Declined            Current Immunizations     Influenza TIV (IM) 10/22/2013    Pneumococcal polysaccharide vaccine (PPSV-23) 3/27/2014 10:23 PM      Below and/or attached are the medications your provider expects you to take. Review all of your home medications and newly ordered medications with your provider and/or pharmacist. Follow medication instructions as directed by your provider and/or pharmacist. Please keep your medication list with you and share with your provider. Update the information when medications are discontinued, doses are changed, or new medications (including over-the-counter products) are added; and carry medication information at all times in the event of emergency situations     Allergies:  AZITHROMYCIN - (reactions not documented)     CIPROFLOXACIN HCL - (reactions not documented)      CLINDAMYCIN - (reactions not documented)     LYRICA - (reactions not documented)     PCN - (reactions not documented)     VALTREX - (reactions not documented)     VICODIN - (reactions not documented)               Medications  Valid as of: February 27, 2017 -  2:19 PM    Generic Name Brand Name Tablet Size Instructions for use    Aspirin (Tab) aspirin 81 MG Take 4 Tabs by mouth every day.        Benzonatate (Cap) TESSALON 200 MG Take 1 Cap by mouth 3 times a day as needed for Cough.        Calcium Carbonate Antacid   Take  by mouth.        Cholecalciferol (Tab) cholecalciferol 1000 UNIT Take 1,000 Units by mouth every day.        Dicyclomine HCl (Cap) BENTYL 10 MG Take 10 mg by mouth 4 Times a Day,Before Meals and at Bedtime.        DiphenhydrAMINE HCl (Sleep)   Take  by mouth.        Doxycycline Hyclate (Tab) VIBRAMYCIN 100 MG Take 1 Tab by mouth 2 times a day.        Fluticasone Propionate (Suspension) FLONASE 50 MCG/ACT Spray 1 Spray in nose 2 times a day.        Gabapentin (Cap) NEURONTIN 100 MG Take one tab qhs x 2 days, then 2 tabs qhs and then 3 tab qhs        Guaifenesin-Codeine (Solution) ROBITUSSIN -10 mg/5mL Take 5 mL by mouth at bedtime as needed for Cough.        Lisinopril (Tab) PRINIVIL 20 MG TAKE ONE TABLET BY MOUTH AT BEDTIME        Lisinopril-Hydrochlorothiazide (Tab) PRINZIDE, ZESTORETIC 20-25 MG TAKE ONE TABLET BY MOUTH DAILY        Metoclopramide HCl (Tab) REGLAN 5 MG Take 1 Tab by mouth 3 times a day before meals.        Metoprolol Tartrate (Tab) LOPRESSOR 25 MG Take 1 Tab by mouth 2 Times a Day.        Omeprazole (CAPSULE DELAYED RELEASE) PRILOSEC 20 MG TAKE ONE CAPSULE BY MOUTH TWICE A DAY        Ondansetron (TABLET DISPERSIBLE) ZOFRAN ODT 8 MG Take 1 Tab by mouth every 8 hours as needed for Nausea/Vomiting.        Raloxifene HCl (Tab) EVISTA 60 MG Take 1 Tab by mouth every day.        .                 Medicines prescribed today were sent to:     [facility] #[id number] - [place]YSSA RECINOS -  [address]    [address] [place] [address]    Phone: [phone] Fax: [phone]    Open 24 Hours?: No      Medication refill instructions:       If your prescription bottle indicates you have medication refills left, it is not necessary to call your provider’s office. Please contact your pharmacy and they will refill your medication.    If your prescription bottle indicates you do not have any refills left, you may request refills at any time through one of the following ways: The online KnowRe system (except Urgent Care), by calling your provider’s office, or by asking your pharmacy to contact your provider’s office with a refill request. Medication refills are processed only during regular business hours and may not be available until the next business day. Your provider may request additional information or to have a follow-up visit with you prior to refilling your medication.   *Please Note: Medication refills are assigned a new Rx number when refilled electronically. Your pharmacy may indicate that no refills were authorized even though a new prescription for the same medication is available at the pharmacy. Please request the medicine by name with the pharmacy before contacting your provider for a refill.           KnowRe Access Code: Activation code not generated  Current KnowRe Status: Active

## 2017-02-27 NOTE — PROGRESS NOTES
Chief Complaint   Patient presents with   • Sinus Problem     also cough / nonproductive       HISTORY OF PRESENT ILLNESS: Patient is a 61 y.o. female who presents today for evaluation of a one-week history of cough and sinus congestion. Patient states that she feels a significant pressure in her face and started having facial swelling last night, and both cheeks. She has had a nonproductive cough but reports chills, sweats, body aches. She denies ear pain, sore throat, nausea, vomiting. She is on an oral chemotherapy agent for breast cancer.    Patient Active Problem List    Diagnosis Date Noted   • Gastroparesis 01/12/2017   • Atypical lobular hyperplasia of breast 01/11/2017   • Lobular hyperplasia, atypical, breast 12/21/2016   • Elevated TSH 10/04/2016   • Depression 05/19/2016   • Trigger finger of right hand 03/16/2016   • Tendinitis 03/16/2016   • Abnormal mammogram 03/16/2016   • Scalp cyst 02/18/2016   • Ankle pain 02/18/2016   • Skin lesion 02/18/2016   • Elevated liver enzymes 11/18/2015   • Vitamin D deficiency 11/18/2015   • Gastroesophageal reflux disease without esophagitis 10/29/2015   • IBS (irritable bowel syndrome) 10/29/2015   • Hiatal hernia 10/29/2015   • Hyperlipidemia 10/29/2015   • Atypical chest pain 03/27/2014   • Dyspnea 03/27/2014   • HTN (hypertension) 10/26/2010       Allergies:Azithromycin; Ciprofloxacin hcl; Clindamycin; Lyrica; Pcn; Valtrex; and Vicodin    Current Outpatient Prescriptions Ordered in [facility]   Medication Sig Dispense Refill   • benzonatate (TESSALON) 200 MG capsule Take 1 Cap by mouth 3 times a day as needed for Cough. 30 Cap 0   • doxycycline (VIBRAMYCIN) 100 MG Tab Take 1 Tab by mouth 2 times a day. 20 Tab 0   • guaifenesin-codeine (ROBITUSSIN AC) Solution oral solution Take 5 mL by mouth at bedtime as needed for Cough. 120 mL 0   • fluticasone (FLONASE) 50 MCG/ACT nasal spray Spray 1 Spray in nose 2 times a day. 1 Bottle 0   • omeprazole (PRILOSEC) 20 MG  delayed-release capsule TAKE ONE CAPSULE BY MOUTH TWICE A  Cap 2   • metoprolol (LOPRESSOR) 25 MG Tab Take 1 Tab by mouth 2 Times a Day. 180 Tab 1   • lisinopril-hydrochlorothiazide (PRINZIDE, ZESTORETIC) 20-25 MG per tablet TAKE ONE TABLET BY MOUTH DAILY 90 Tab 2   • metoclopramide (REGLAN) 5 MG tablet Take 1 Tab by mouth 3 times a day before meals. 120 Tab 2   • ondansetron (ZOFRAN ODT) 8 MG TABLET DISPERSIBLE Take 1 Tab by mouth every 8 hours as needed for Nausea/Vomiting. 30 Tab 0   • raloxifene (EVISTA) 60 MG Tab Take 1 Tab by mouth every day. 30 Tab 3   • lisinopril (PRINIVIL) 20 MG Tab TAKE ONE TABLET BY MOUTH AT BEDTIME 90 Tab 1   • DiphenhydrAMINE HCl, Sleep, (UNISOM SLEEPGELS PO) Take  by mouth.     • Calcium Carbonate Antacid (DILAN-SELTZER ANTACID PO) Take  by mouth.     • gabapentin (NEURONTIN) 100 MG Cap Take one tab qhs x 2 days, then 2 tabs qhs and then 3 tab qhs 60 Cap 1   • vitamin D (CHOLECALCIFEROL) 1000 UNIT Tab Take 1,000 Units by mouth every day.     • dicyclomine (BENTYL) 10 MG Cap Take 10 mg by mouth 4 Times a Day,Before Meals and at Bedtime.     • aspirin 81 MG tablet Take 4 Tabs by mouth every day. 100 Tab 3     No current Epic-ordered facility-administered medications on file.       Past Medical History   Diagnosis Date   • HTN (hypertension)    • Insomnia    • Pneumonia    • IBS (irritable bowel syndrome)    • GERD (gastroesophageal reflux disease)    • Hiatal hernia 10/29/2015   • Vitamin D deficiency 2015   • Trigger finger of right hand 3/16/2016   • Abnormal mammogram 3/16/2016       Social History   Substance Use Topics   • Smoking status: Former Smoker   • Smokeless tobacco: Never Used   • Alcohol Use: No       Family Status   Relation Status Death Age   • Mother     • Father     • Sister Alive    • Brother Alive    History reviewed. No pertinent family history.    ROS:   Review of Systems   Constitutional: Negative for  weight loss and malaise/fatigue.    HENT: Negative for ear pain, nosebleeds, sore throat and neck pain.    Eyes: Negative for blurred vision.   Respiratory: Negative for sputum production, shortness of breath and wheezing.    Cardiovascular: Negative for chest pain, palpitations, orthopnea and leg swelling.   Gastrointestinal: Negative for heartburn, nausea, vomiting and abdominal pain.   Genitourinary: Negative for dysuria, urgency and frequency.       Exam:  Blood pressure 116/80, pulse 88, temperature 37 °C (98.6 °F), resp. rate 14, height 1.524 m (5'), SpO2 97 %.  General: Normal appearing. No distress.  HEENT: Conjunctiva clear, lids without ptosis, ears normal shape and contour, canals are clear bilaterally, tympanic membranes are benign, nasal mucosa markedly edematous bilaterally, oropharynx is without erythema, edema or exudates. Trace edema over both cheeks. Maxillary tenderness to palpation bilaterally.  Pulmonary: Clear to ausculation and percussion.  Normal effort. No rales, ronchi, or wheezing.   Cardiovascular: Regular rate and rhythm without murmur.   Neurologic: Grossly nonfocal.  Lymph: No cervical lymphadenopathy noted.  Skin: No obvious lesions.  Psych: Normal mood. Alert and oriented x3. Judgment and insight is normal.    Assessment/Plan:  Take all medications as directed. Discussed appropriate over-the-counter symptomatic medication, and when to return to clinic. Follow-up for worsening or persistent symptoms.   1. Acute maxillary sinusitis, recurrence not specified  doxycycline (VIBRAMYCIN) 100 MG Tab    fluticasone (FLONASE) 50 MCG/ACT nasal spray   2. Lower resp. tract infection  benzonatate (TESSALON) 200 MG capsule    guaifenesin-codeine (ROBITUSSIN AC) Solution oral solution

## 2017-03-07 ENCOUNTER — OFFICE VISIT (OUTPATIENT)
Dept: HEMATOLOGY ONCOLOGY | Facility: PHYSICIAN GROUP | Age: 62
End: 2017-03-07
Payer: COMMERCIAL

## 2017-03-07 VITALS
RESPIRATION RATE: 16 BRPM | OXYGEN SATURATION: 95 % | SYSTOLIC BLOOD PRESSURE: 102 MMHG | TEMPERATURE: 98.4 F | WEIGHT: 180 LBS | BODY MASS INDEX: 35.15 KG/M2 | HEART RATE: 72 BPM | DIASTOLIC BLOOD PRESSURE: 72 MMHG

## 2017-03-07 DIAGNOSIS — R79.89 ELEVATED LFTS: ICD-10-CM

## 2017-03-07 DIAGNOSIS — N60.99 ATYPICAL LOBULAR HYPERPLASIA OF BREAST: ICD-10-CM

## 2017-03-07 PROCEDURE — 99214 OFFICE O/P EST MOD 30 MIN: CPT | Performed by: INTERNAL MEDICINE

## 2017-03-07 ASSESSMENT — PAIN SCALES - GENERAL: PAINLEVEL: 8=MODERATE-SEVERE PAIN

## 2017-03-07 NOTE — PROGRESS NOTES
Follow Up Note:  Oncology    Date: 3/7/17  Time:  11:40 am  Location: [place], [place]      Referring Physician: RANJANA Monique  Primary Care:  RANJANA Monique  Oncology: Dr. Hyatt   Surgery:     Diagnosis: left breast atypical lobular hyperplasia and flat epithelial atypia     Chief Complaint: She is here for a second opinion.    History of Presenting Illness:  Yesenia Huff is a 61 y.o. Female diagnosed in 4/2016 with left breast atypical lobular hyperplasia and flat epithelial changes. She was diagnosed secondary to an abnormal mammogram which showed changes in the left breast with faint calcifications and 2 oval masses in the posterior left breast. A 4 mm lesion in the right breast as well. Biopsy of the left breast calcifications were positive for flat epithelial atypia with associated calcification and focal atypical lobular hyperplasia. She was initially seen by Dr. Wright and was started on tamoxifen in 4/2016. She was unable to tolerate it secondary to multiple issues and discontinued therapy in 8/2016. She had improvement in her symptoms. 9/2016 she was then started Arimidex and has tolerated better. She however had issues with nausea, vomiting as well as hot flashes. She also had atypical symptoms such as blurred vision, dizziness as well as reflux symptoms. She established care with me and on further discussion was recommended raloxifene as there was no indication for Aromasin based on her atypia for chemoprophylaxis. He started Raloxifene in 11/2016. Her last mammogram was in 11/2016 which did not show any concerning findings. During her last visit she complained of swelling in the legs and her son was done which was negative for DVT. She secondary to her hot flashes C was started on low dose of gabapentin but she was not unable to tolerate it.    Interval history:   She is here for follow-up visit and is accompanied by her daughter was present in the room. She has been fairly stable since her  last visit. She has been tolerating the raloxifene well. She continues to have hot flashes which are persistent. With the gabapentin she had diffuse itching of her scalp hence discontinued. She was treated with Effexor in the past and did not like the side effects either. He otherwise denies any significant swelling of her leg since her last visit. He has low but stable energy with stable appetite and weight. She denies any fevers, chills or night sweats.      Past Medical History:  1. Breast typical lobular hyperplasia  2. Hypertension  3. Diabetes  4. History of hiatal hernia  5. Vitamin D deficiency  6. Insomnia  7. Hyperlipidemia  8. Arthritis  9. 2008 history of shingles        Allergies as of 03/07/2017 - Lc as Reviewed 02/27/2017   Allergen Reaction Noted   • Azithromycin  03/27/2014   • Ciprofloxacin hcl  03/29/2016   • Clindamycin  01/14/2014   • Lyrica  01/14/2014   • Pcn [penicillins]  08/28/2013   • Valtrex [valacyclovir hcl]  01/14/2014   • Vicodin [hydrocodone-acetaminophen]  08/28/2013         Current outpatient prescriptions:   •  benzonatate (TESSALON) 200 MG capsule, Take 1 Cap by mouth 3 times a day as needed for Cough., Disp: 30 Cap, Rfl: 0  •  doxycycline (VIBRAMYCIN) 100 MG Tab, Take 1 Tab by mouth 2 times a day., Disp: 20 Tab, Rfl: 0  •  guaifenesin-codeine (ROBITUSSIN AC) Solution oral solution, Take 5 mL by mouth at bedtime as needed for Cough., Disp: 120 mL, Rfl: 0  •  fluticasone (FLONASE) 50 MCG/ACT nasal spray, Spray 1 Spray in nose 2 times a day., Disp: 1 Bottle, Rfl: 0  •  omeprazole (PRILOSEC) 20 MG delayed-release capsule, TAKE ONE CAPSULE BY MOUTH TWICE A DAY, Disp: 180 Cap, Rfl: 2  •  metoprolol (LOPRESSOR) 25 MG Tab, Take 1 Tab by mouth 2 Times a Day., Disp: 180 Tab, Rfl: 1  •  lisinopril-hydrochlorothiazide (PRINZIDE, ZESTORETIC) 20-25 MG per tablet, TAKE ONE TABLET BY MOUTH DAILY, Disp: 90 Tab, Rfl: 2  •  DiphenhydrAMINE HCl, Sleep, (UNISOM SLEEPGELS PO), Take  by mouth.,  Disp: , Rfl:   •  Calcium Carbonate Antacid (DILAN-SELTZER ANTACID PO), Take  by mouth., Disp: , Rfl:   •  metoclopramide (REGLAN) 5 MG tablet, Take 1 Tab by mouth 3 times a day before meals., Disp: 120 Tab, Rfl: 2  •  gabapentin (NEURONTIN) 100 MG Cap, Take one tab qhs x 2 days, then 2 tabs qhs and then 3 tab qhs, Disp: 60 Cap, Rfl: 1  •  ondansetron (ZOFRAN ODT) 8 MG TABLET DISPERSIBLE, Take 1 Tab by mouth every 8 hours as needed for Nausea/Vomiting., Disp: 30 Tab, Rfl: 0  •  raloxifene (EVISTA) 60 MG Tab, Take 1 Tab by mouth every day., Disp: 30 Tab, Rfl: 3  •  lisinopril (PRINIVIL) 20 MG Tab, TAKE ONE TABLET BY MOUTH AT BEDTIME, Disp: 90 Tab, Rfl: 1  •  vitamin D (CHOLECALCIFEROL) 1000 UNIT Tab, Take 1,000 Units by mouth every day., Disp: , Rfl:   •  dicyclomine (BENTYL) 10 MG Cap, Take 10 mg by mouth 4 Times a Day,Before Meals and at Bedtime., Disp: , Rfl:   •  aspirin 81 MG tablet, Take 4 Tabs by mouth every day., Disp: 100 Tab, Rfl: 3    Review of Systems:  All other review of systems are negative except what was mentioned above in the HPI.  Constitutional: Negative for fever, chills, and weight loss. Positive for malaise/fatigue.    HENT: Negative for ear pain and nosebleeds.     Eyes: Negative for blurred vision.    Respiratory: Negative for cough. Positive for intermittent shortness of breath on exertion.    Cardiovascular: Negative for chest pain and leg swelling.    Gastrointestinal: Postiive for heartburn improving. Positive for intermittent nausea. Negative for vomiting or abdominal pain.    Genitourinary: Negative for dysuria.    Musculoskeletal: Positive for myalgias, back pain and joint pain.    Skin: Negative for rash.    Neurological: Negative for dizziness, sensory change, focal weakness and headaches.    Endo/Heme/Allergies: No bruise/bleed easily.    Psychiatric/Behavioral: Negative for depression and memory loss.      Physical Exam:  Filed Vitals:    03/07/17 1208   BP: 102/72   Pulse: 72    Temp: 36.9 °C (98.4 °F)   Resp: 16   Weight: 81.647 kg (180 lb)   SpO2: 95%       General: Not in acute distress, alert and oriented x 3  HEENT: normalcephalic, atraumatic, pupils equally reactive to light bilaterally, extra ocular muscles intact, moist oral mucus membranes, and oral cavity without any lesions.  Neck: Supple neck and full range of motion  Lymph nodes: No palpable bilateral cervical, supraclavicular, axillary or inguinal lymphadenopathy.    CVS: regular rate and rhythm, no rubs or gallops  RESP: Clear to auscultate bilaterally.  Breast exam: Deferred   ABD: Soft, non tender, non distended, positive bowel sounds, and no palpable hepatomegaly   EXT: No edema  CNS: Alert and oriented x3, cranial nerves grossly intact, muscle strength grossly intact, and normal gait.     Labs: No new labs    Imagin. 17 Ultrasound lower extremity: No evidence of right lower extremity deep venous thrombosis.      Assessment & Plan:  1. Left breast atypical lobular hyperplasia and flat epithelial atypia: She was diagnosed secondary to abnormal mammogram which showed small calcifications. Biopsies were consistent with atypical lobular hyperplasia and flat epithelial atypia. She has been on chemotherapy prophylaxis. Initially she was started on tamoxifen but was unable to tolerate it. She was then on Aromasin which had some side effects. She most recently since 2016 has been on raloxifene and has been tolerating it well. She has been on chemoprophylaxis on and off since her diagnosis. Plan is to treat her for at least 5 years of chemoprophylaxis. She is continued on a yearly mammograms. Her next mammogram is due in 2017.  2. Hot flashes: He was unable to tolerate Effexor in the past. She was on low doses of gabapentin but was unable to tolerate it secondary to diffuse scalp itching. I went over the option of 3 attempting lower doses of Effexor to see if this may be helpful. Patient states that her hot  flashes are fairly stable without significant worsening and would like to defer it for now.  3. Reflux and early satiety: DrJacey be secondary to gastroparesis likely from her diabetes. She is continued on Reglan as needed with improvement.  4. Elevated liver enzymes: She has mild elevation of her liver enzymes. Unclear etiology. I repeat ultrasound of the abdomen has been ordered but pending.  5. Left calf pain: Resolved. Ultrasound of the leg was negative for DVT.  6. She is to follow-up again in approximately 3 months or sooner as needed.    She agreed and verbalized her agreement and understanding with the current plan. I answered all questions and concerns she has at this time and advised her to call at any time in the interim with questions or concerns in regards to her care. Thank you for allowing me to participate in her care, I will continue to follow.    Please note that this dictation was created using voice recognition software. I have made every reasonable attempt to correct obvious errors, but I expect that there are errors of grammar and possibly content that I did not discover before finalizing the note.

## 2017-03-09 DIAGNOSIS — N60.99 ATYPICAL LOBULAR HYPERPLASIA OF BREAST: ICD-10-CM

## 2017-03-09 RX ORDER — RALOXIFENE HYDROCHLORIDE 60 MG/1
60 TABLET, FILM COATED ORAL DAILY
Qty: 30 TAB | Refills: 5 | Status: SHIPPED | OUTPATIENT
Start: 2017-03-09 | End: 2017-07-06 | Stop reason: SDUPTHER

## 2017-03-21 ENCOUNTER — TELEPHONE (OUTPATIENT)
Dept: HEMATOLOGY ONCOLOGY | Facility: MEDICAL CENTER | Age: 62
End: 2017-03-21

## 2017-03-21 NOTE — TELEPHONE ENCOUNTER
Patient called asking for her results of US please review and advise thank you. ORIN she started [employer]

## 2017-05-11 ENCOUNTER — TELEPHONE (OUTPATIENT)
Dept: URGENT CARE | Facility: PHYSICIAN GROUP | Age: 62
End: 2017-05-11

## 2017-05-11 ENCOUNTER — OFFICE VISIT (OUTPATIENT)
Dept: MEDICAL GROUP | Facility: PHYSICIAN GROUP | Age: 62
End: 2017-05-11
Payer: COMMERCIAL

## 2017-05-11 VITALS
DIASTOLIC BLOOD PRESSURE: 82 MMHG | OXYGEN SATURATION: 96 % | SYSTOLIC BLOOD PRESSURE: 126 MMHG | HEART RATE: 84 BPM | TEMPERATURE: 98.2 F

## 2017-05-11 DIAGNOSIS — Z13.820 SCREENING FOR OSTEOPOROSIS: ICD-10-CM

## 2017-05-11 DIAGNOSIS — Z12.11 COLON CANCER SCREENING: ICD-10-CM

## 2017-05-11 DIAGNOSIS — B36.9 FUNGAL DERMATITIS: ICD-10-CM

## 2017-05-11 DIAGNOSIS — I10 ESSENTIAL HYPERTENSION: ICD-10-CM

## 2017-05-11 DIAGNOSIS — E78.5 HYPERLIPIDEMIA, UNSPECIFIED HYPERLIPIDEMIA TYPE: ICD-10-CM

## 2017-05-11 DIAGNOSIS — N60.99 LOBULAR HYPERPLASIA, ATYPICAL, BREAST: ICD-10-CM

## 2017-05-11 PROCEDURE — 99214 OFFICE O/P EST MOD 30 MIN: CPT | Performed by: FAMILY MEDICINE

## 2017-05-11 RX ORDER — KETOCONAZOLE 20 MG/G
CREAM TOPICAL
Qty: 1 TUBE | Refills: 5 | Status: SHIPPED | OUTPATIENT
Start: 2017-05-11 | End: 2018-06-01

## 2017-05-11 ASSESSMENT — PATIENT HEALTH QUESTIONNAIRE - PHQ9: CLINICAL INTERPRETATION OF PHQ2 SCORE: 0

## 2017-05-11 NOTE — MR AVS SNAPSHOT
Yesenia Huff   2017 11:20 AM   Office Visit   MRN: [id number]    Department:  [facility]   Dept Phone:  [phone]    Description:  Female : 1955   Provider:  Catrina Khan M.D.           Reason for Visit     Hypertension           Allergies as of 2017     Allergen Noted Reactions    Azithromycin 2014       Heart racing    Ciprofloxacin Hcl 2016       Clindamycin 2014       Gabapentin 2017   Itching    Itching on back of head.    Lyrica 2014       Pcn [Penicillins] 2013       Valtrex [Valacyclovir Hcl] 2014       Vicodin [Hydrocodone-Acetaminophen] 2013         You were diagnosed with     Essential hypertension   [7543736]       Fungal dermatitis   [925011]       Lobular hyperplasia, atypical, breast   [318713]       Screening for osteoporosis   [675902]       Hyperlipidemia, unspecified hyperlipidemia type   [8670541]       Colon cancer screening   [663216]         Vital Signs     Blood Pressure Pulse Temperature Oxygen Saturation Smoking Status       126/82 mmHg 84 36.8 °C (98.2 °F) 96% Former Smoker       Basic Information     Date Of Birth Sex Race Ethnicity Preferred Language    1955 Female White Non- English      Your appointments     2017 11:00 AM   ONCOLOGY NEW PATIENT 60 MIN with Reagan Herzog M.D.   [facility] (--)    [address] [place] [address], [address]  [facility] [id number] [phone]              Problem List              ICD-10-CM Priority Class Noted - Resolved    HTN (hypertension) I10   10/26/2010 - Present    Gastroesophageal reflux disease without esophagitis K21.9   10/29/2015 - Present    IBS (irritable bowel syndrome) K58.9   10/29/2015 - Present    Hiatal hernia K44.9   10/29/2015 - Present    Hyperlipidemia E78.5   10/29/2015 - Present    Elevated liver enzymes R74.8   2015 - Present    Vitamin D deficiency E55.9   2015 - Present    Skin lesion L98.9   2016 -  Present    Tendinitis M77.9   3/16/2016 - Present    Abnormal mammogram R92.8   3/16/2016 - Present    Depression F32.9   5/19/2016 - Present    Elevated TSH R94.6   10/4/2016 - Present    Lobular hyperplasia, atypical, breast N60.99   12/21/2016 - Present    Atypical lobular hyperplasia of breast N60.99   1/11/2017 - Present    Gastroparesis K31.84   1/12/2017 - Present    Fungal dermatitis B36.9   5/11/2017 - Present    Screening for osteoporosis Z13.820   5/11/2017 - Present      Health Maintenance        Date Due Completion Dates    IMM DTaP/Tdap/Td Vaccine (1 - Tdap) 4/23/1976 ---    PAP SMEAR 8/28/2016 8/28/2013 (Declined)    Override on 8/28/2013: Patient Declined    MAMMOGRAM 3/9/2017 3/9/2016    IMM ZOSTER VACCINE 4/23/2017 ---    COLONOSCOPY 8/28/2023 8/28/2013 (Declined)    Override on 8/28/2013: Patient Declined            Current Immunizations     Influenza TIV (IM) 10/22/2013    Pneumococcal polysaccharide vaccine (PPSV-23) 3/27/2014 10:23 PM      Below and/or attached are the medications your provider expects you to take. Review all of your home medications and newly ordered medications with your provider and/or pharmacist. Follow medication instructions as directed by your provider and/or pharmacist. Please keep your medication list with you and share with your provider. Update the information when medications are discontinued, doses are changed, or new medications (including over-the-counter products) are added; and carry medication information at all times in the event of emergency situations     Allergies:  AZITHROMYCIN - (reactions not documented)     CIPROFLOXACIN HCL - (reactions not documented)     CLINDAMYCIN - (reactions not documented)     GABAPENTIN - Itching     LYRICA - (reactions not documented)     PCN - (reactions not documented)     VALTREX - (reactions not documented)     VICODIN - (reactions not documented)               Medications  Valid as of: May 11, 2017 - 12:01 PM    Generic  Name Brand Name Tablet Size Instructions for use    Aspirin (Tab) aspirin 81 MG Take 4 Tabs by mouth every day.        Benzonatate (Cap) TESSALON 200 MG Take 1 Cap by mouth 3 times a day as needed for Cough.        Calcium Carbonate Antacid   Take  by mouth.        Cholecalciferol (Tab) cholecalciferol 1000 UNIT Take 1,000 Units by mouth every day.        Dicyclomine HCl (Cap) BENTYL 10 MG Take 10 mg by mouth 4 Times a Day,Before Meals and at Bedtime.        DiphenhydrAMINE HCl (Sleep)   Take  by mouth.        Doxycycline Hyclate (Tab) VIBRAMYCIN 100 MG Take 1 Tab by mouth 2 times a day.        Fluticasone Propionate (Suspension) FLONASE 50 MCG/ACT Spray 1 Spray in nose 2 times a day.        Fluticasone Propionate (Suspension) FLONASE 50 MCG/ACT PLACE ONE SPRAY IN EACH NOSTRIL ONCE DAILY        Gabapentin (Cap) NEURONTIN 100 MG Take one tab qhs x 2 days, then 2 tabs qhs and then 3 tab qhs        Guaifenesin-Codeine (Solution) ROBITUSSIN -10 mg/5mL Take 5 mL by mouth at bedtime as needed for Cough.        Lisinopril (Tab) PRINIVIL 20 MG TAKE ONE TABLET BY MOUTH AT BEDTIME        Lisinopril-Hydrochlorothiazide (Tab) PRINZIDE, ZESTORETIC 20-25 MG TAKE ONE TABLET BY MOUTH DAILY        Metoclopramide HCl (Tab) REGLAN 5 MG Take 1 Tab by mouth 3 times a day before meals.        Metoprolol Tartrate (Tab) LOPRESSOR 25 MG Take 1 Tab by mouth 2 Times a Day.        Omeprazole (CAPSULE DELAYED RELEASE) PRILOSEC 20 MG TAKE ONE CAPSULE BY MOUTH TWICE A DAY        Ondansetron (TABLET DISPERSIBLE) ZOFRAN ODT 8 MG Take 1 Tab by mouth every 8 hours as needed for Nausea/Vomiting.        Raloxifene HCl (Tab) EVISTA 60 MG Take 1 Tab by mouth every day.        .                 Medicines prescribed today were sent to:     [facility] #[id number] - [place], [place] - [address]    [address] [place] [address]    Phone: [phone] Fax: [phone]    Open 24 Hours?: No      Medication refill instructions:       If your prescription  bottle indicates you have medication refills left, it is not necessary to call your provider’s office. Please contact your pharmacy and they will refill your medication.    If your prescription bottle indicates you do not have any refills left, you may request refills at any time through one of the following ways: The online [employer] system (except Urgent Care), by calling your provider’s office, or by asking your pharmacy to contact your provider’s office with a refill request. Medication refills are processed only during regular business hours and may not be available until the next business day. Your provider may request additional information or to have a follow-up visit with you prior to refilling your medication.   *Please Note: Medication refills are assigned a new Rx number when refilled electronically. Your pharmacy may indicate that no refills were authorized even though a new prescription for the same medication is available at the pharmacy. Please request the medicine by name with the pharmacy before contacting your provider for a refill.        Your To Do List     Future Labs/Procedures Complete By Expires    DS-BONE DENSITY STUDY (DEXA)  As directed 5/11/2018    OCCULT BLOOD FECES IMMUNOASSAY  As directed 5/12/2018         [employer] Access Code: Activation code not generated  Current [employer] Status: Active

## 2017-05-11 NOTE — ASSESSMENT & PLAN NOTE
Blood pressure is controlled. She is on on metoprolol, lisinopril 20 mg twice a day and hctz 25  She has no headache or chest pain or lower extremity edema. She is a former smoker.

## 2017-05-11 NOTE — ASSESSMENT & PLAN NOTE
Since the warmer weather started. She has itchy, painful, red rash located under both breasts. This has been present for the last few days. She has noticed no improvement with cornstarch or topical steroid cream.

## 2017-05-11 NOTE — TELEPHONE ENCOUNTER
1. Caller Name: Yesenia                      Call Back Number: [phone] (home)       2. Message: Patient states that there was a fungal cream that needed to be [facility], but I am not seeing if it was sent over. Please advise?    3. Patient approves office to leave a detailed voicemail/MyChart message: yes

## 2017-05-11 NOTE — ASSESSMENT & PLAN NOTE
About a year ago she had a dream which featured her sister who she has not been incontinent for 20 hours. This prompted her to check mammogram, which showed an abnormality resulting in identifying a breast cancer which was removed with lumpectomy. There was no spread to lymph nodes. Follow-up mammograms have been normal. She was placed on raloxifene every day for 5 years. And she is one year into treatment Will check dexa now and every 2 yrs, pt with gerd, hiatal hernia, no oral bisphosphonate, infusin if necessary.   She follows up every month with Hematology oncology and her next appointment is in June  mammo next due in October.

## 2017-05-12 NOTE — PROGRESS NOTES
Subjective:   Yesenia Huff is a 62 y.o. female here today for hypertension, which is chronic and controlled, acute uncontrolled rash, history of breast cancer, hyperlipidemia, which is uncontrolled.    HTN (hypertension)  Blood pressure is controlled. She is on on metoprolol, lisinopril 20 mg twice a day and hctz 25  She has no headache or chest pain or lower extremity edema. She is a former smoker.    Fungal dermatitis  Since the warmer weather started. She has itchy, painful, red rash located under both breasts. This has been present for the last few days. She has noticed no improvement with cornstarch or topical steroid cream.    Lobular hyperplasia, atypical, breast  About a year ago she had a dream which featured her sister who she has not been incontinent for 20 hours. This prompted her to check mammogram, which showed an abnormality resulting in identifying a breast cancer which was removed with lumpectomy. There was no spread to lymph nodes. Follow-up mammograms have been normal. She was placed on raloxifene every day for 5 years. And she is one year into treatment Will check dexa now and every 2 yrs, pt with gerd, hiatal hernia, no oral bisphosphonate, infusin if necessary.   She follows up every month with Hematology oncology and her next appointment is in June  mammo next due in October.     Hyperlipidemia  Avoid statin and zetia due to myopathy.   Pt will restart krill oil, nonitor diet, reg exercise.            Current medicines (including changes today)  Current Outpatient Prescriptions   Medication Sig Dispense Refill   • ketoconazole (NIZORAL) 2 % Cream Apply liberally to affected skin once a day 1 Tube 5   • fluticasone (FLONASE) 50 MCG/ACT nasal spray PLACE ONE SPRAY IN EACH NOSTRIL ONCE DAILY 1 Bottle 1   • raloxifene (EVISTA) 60 MG Tab Take 1 Tab by mouth every day. 30 Tab 5   • omeprazole (PRILOSEC) 20 MG delayed-release capsule TAKE ONE CAPSULE BY MOUTH TWICE A  Cap 2   • metoprolol  (LOPRESSOR) 25 MG Tab Take 1 Tab by mouth 2 Times a Day. 180 Tab 1   • lisinopril-hydrochlorothiazide (PRINZIDE, ZESTORETIC) 20-25 MG per tablet TAKE ONE TABLET BY MOUTH DAILY 90 Tab 2   • DiphenhydrAMINE HCl, Sleep, (UNISOM SLEEPGELS PO) Take  by mouth.     • vitamin D (CHOLECALCIFEROL) 1000 UNIT Tab Take 1,000 Units by mouth every day.     • aspirin 81 MG tablet Take 4 Tabs by mouth every day. 100 Tab 3   • benzonatate (TESSALON) 200 MG capsule Take 1 Cap by mouth 3 times a day as needed for Cough. 30 Cap 0   • doxycycline (VIBRAMYCIN) 100 MG Tab Take 1 Tab by mouth 2 times a day. 20 Tab 0   • guaifenesin-codeine (ROBITUSSIN AC) Solution oral solution Take 5 mL by mouth at bedtime as needed for Cough. 120 mL 0   • fluticasone (FLONASE) 50 MCG/ACT nasal spray Spray 1 Spray in nose 2 times a day. 1 Bottle 0   • Calcium Carbonate Antacid (DILAN-SELTZER ANTACID PO) Take  by mouth.     • metoclopramide (REGLAN) 5 MG tablet Take 1 Tab by mouth 3 times a day before meals. 120 Tab 2   • gabapentin (NEURONTIN) 100 MG Cap Take one tab qhs x 2 days, then 2 tabs qhs and then 3 tab qhs 60 Cap 1   • ondansetron (ZOFRAN ODT) 8 MG TABLET DISPERSIBLE Take 1 Tab by mouth every 8 hours as needed for Nausea/Vomiting. 30 Tab 0   • lisinopril (PRINIVIL) 20 MG Tab TAKE ONE TABLET BY MOUTH AT BEDTIME 90 Tab 1   • dicyclomine (BENTYL) 10 MG Cap Take 10 mg by mouth 4 Times a Day,Before Meals and at Bedtime.       No current facility-administered medications for this visit.     She  has a past medical history of HTN (hypertension); Insomnia; Pneumonia; IBS (irritable bowel syndrome); GERD (gastroesophageal reflux disease); Hiatal hernia (10/29/2015); Vitamin D deficiency (11/18/2015); Trigger finger of right hand (3/16/2016); and Abnormal mammogram (3/16/2016).    ROS   No chest pain, no shortness of breath, no abdominal pain       Objective:     Blood pressure 126/82, pulse 84, temperature 36.8 °C (98.2 °F), SpO2 96 %. There is no weight on  file to calculate BMI.   Physical Exam: Note, patient routinely refuses to have weight checked        Constitutional: Alert, no distress.  Skin: Warm, dry, good turgor, macular, erythematous rash under both breasts  Eye: Equal, round and reactive, conjunctiva clear, lids normal.  ENMT: Lips without lesions, good dentition, oropharynx clear.  Neck: Trachea midline, no masses, no thyromegaly. No cervical or supraclavicular lymphadenopathy  Respiratory: Unlabored respiratory effort, lungs clear to auscultation, no wheezes, no ronchi.  Cardiovascular: Normal S1, S2, no murmur, no edema.  Abdomen: Soft, non-tender, no masses, no hepatosplenomegaly.  Psych: Alert and oriented x3, normal affect and mood.        Assessment and Plan:   The following treatment plan was discussed    1. Essential hypertension  Continue current medications. Controlled    2. Fungal dermatitis  Topical antifungal  - ketoconazole (NIZORAL) 2 % Cream; Apply liberally to affected skin once a day  Dispense: 1 Tube; Refill: 5    3. Lobular hyperplasia, atypical, breast  Follow-up with hematology oncology and routine mammograms continue raloxifene    4. Screening for osteoporosis    - DS-BONE DENSITY STUDY (DEXA); Future    5. Hyperlipidemia, unspecified hyperlipidemia type  Advised on diet changes. Did not tolerate statins due to severe myopathy advised to start krill oil    6. Colon cancer screening  Refuses colonoscopy  - OCCULT BLOOD FECES IMMUNOASSAY; Future      Followup: Return in about 3 months (around 8/11/2017) for pap.

## 2017-05-24 ENCOUNTER — APPOINTMENT (OUTPATIENT)
Dept: MEDICAL GROUP | Facility: CLINIC | Age: 62
End: 2017-05-24
Payer: COMMERCIAL

## 2017-05-24 ENCOUNTER — TELEPHONE (OUTPATIENT)
Dept: MEDICAL GROUP | Facility: CLINIC | Age: 62
End: 2017-05-24

## 2017-05-24 ENCOUNTER — HOSPITAL ENCOUNTER (OUTPATIENT)
Facility: MEDICAL CENTER | Age: 62
End: 2017-05-24
Attending: INTERNAL MEDICINE
Payer: COMMERCIAL

## 2017-05-24 DIAGNOSIS — R79.89 ELEVATED LFTS: ICD-10-CM

## 2017-05-24 DIAGNOSIS — Z13.6 SCREENING FOR CARDIOVASCULAR CONDITION: ICD-10-CM

## 2017-05-24 LAB
ALBUMIN SERPL BCP-MCNC: 4 G/DL (ref 3.2–4.9)
ALBUMIN/GLOB SERPL: 1 G/DL
ALP SERPL-CCNC: 56 U/L (ref 30–99)
ALT SERPL-CCNC: 32 U/L (ref 2–50)
ANION GAP SERPL CALC-SCNC: 9 MMOL/L (ref 0–11.9)
AST SERPL-CCNC: 46 U/L (ref 12–45)
BASOPHILS # BLD AUTO: 0.4 % (ref 0–1.8)
BASOPHILS # BLD: 0.02 K/UL (ref 0–0.12)
BILIRUB SERPL-MCNC: 0.7 MG/DL (ref 0.1–1.5)
BUN SERPL-MCNC: 13 MG/DL (ref 8–22)
CALCIUM SERPL-MCNC: 10.1 MG/DL (ref 8.5–10.5)
CHLORIDE SERPL-SCNC: 102 MMOL/L (ref 96–112)
CO2 SERPL-SCNC: 25 MMOL/L (ref 20–33)
CREAT SERPL-MCNC: 0.64 MG/DL (ref 0.5–1.4)
EOSINOPHIL # BLD AUTO: 0.07 K/UL (ref 0–0.51)
EOSINOPHIL NFR BLD: 1.4 % (ref 0–6.9)
ERYTHROCYTE [DISTWIDTH] IN BLOOD BY AUTOMATED COUNT: 44.6 FL (ref 35.9–50)
GFR SERPL CREATININE-BSD FRML MDRD: >60 ML/MIN/1.73 M 2
GLOBULIN SER CALC-MCNC: 3.9 G/DL (ref 1.9–3.5)
GLUCOSE SERPL-MCNC: 94 MG/DL (ref 65–99)
HCT VFR BLD AUTO: 37.5 % (ref 37–47)
HGB BLD-MCNC: 11.8 G/DL (ref 12–16)
IMM GRANULOCYTES # BLD AUTO: 0.02 K/UL (ref 0–0.11)
IMM GRANULOCYTES NFR BLD AUTO: 0.4 % (ref 0–0.9)
LYMPHOCYTES # BLD AUTO: 1.46 K/UL (ref 1–4.8)
LYMPHOCYTES NFR BLD: 29.7 % (ref 22–41)
MCH RBC QN AUTO: 26.6 PG (ref 27–33)
MCHC RBC AUTO-ENTMCNC: 31.5 G/DL (ref 33.6–35)
MCV RBC AUTO: 84.7 FL (ref 81.4–97.8)
MONOCYTES # BLD AUTO: 0.23 K/UL (ref 0–0.85)
MONOCYTES NFR BLD AUTO: 4.7 % (ref 0–13.4)
NEUTROPHILS # BLD AUTO: 3.12 K/UL (ref 2–7.15)
NEUTROPHILS NFR BLD: 63.4 % (ref 44–72)
NRBC # BLD AUTO: 0 K/UL
NRBC BLD AUTO-RTO: 0 /100 WBC
PLATELET # BLD AUTO: 145 K/UL (ref 164–446)
PMV BLD AUTO: 12.2 FL (ref 9–12.9)
POTASSIUM SERPL-SCNC: 4.1 MMOL/L (ref 3.6–5.5)
PROT SERPL-MCNC: 7.9 G/DL (ref 6–8.2)
RBC # BLD AUTO: 4.43 M/UL (ref 4.2–5.4)
SODIUM SERPL-SCNC: 136 MMOL/L (ref 135–145)
WBC # BLD AUTO: 4.9 K/UL (ref 4.8–10.8)

## 2017-05-24 PROCEDURE — 85025 COMPLETE CBC W/AUTO DIFF WBC: CPT

## 2017-05-24 PROCEDURE — 80053 COMPREHEN METABOLIC PANEL: CPT

## 2017-05-24 NOTE — TELEPHONE ENCOUNTER
1. Caller Name:                                          Call Back Number:       Patient approves a detailed voicemail message: N\A    SPECIFIC Action To Be Taken: Orders pending, please sign.     Please order lipid profile for future.

## 2017-07-05 RX ORDER — LISINOPRIL 20 MG/1
TABLET ORAL
Qty: 90 TAB | Refills: 3 | Status: SHIPPED | OUTPATIENT
Start: 2017-07-05 | End: 2018-08-09 | Stop reason: SDUPTHER

## 2017-07-05 NOTE — TELEPHONE ENCOUNTER
Dr. Khan, Refill request. Unsure if patient is currently taking Lisinopril 20 mg or Lisinopril/HCTZ 20/25. Please advise. Thank you.

## 2017-07-06 ENCOUNTER — OFFICE VISIT (OUTPATIENT)
Dept: HEMATOLOGY ONCOLOGY | Facility: MEDICAL CENTER | Age: 62
End: 2017-07-06
Payer: COMMERCIAL

## 2017-07-06 VITALS
DIASTOLIC BLOOD PRESSURE: 82 MMHG | HEIGHT: 60 IN | TEMPERATURE: 98.2 F | SYSTOLIC BLOOD PRESSURE: 124 MMHG | OXYGEN SATURATION: 97 % | RESPIRATION RATE: 14 BRPM | HEART RATE: 69 BPM

## 2017-07-06 DIAGNOSIS — N60.99 ATYPICAL LOBULAR HYPERPLASIA OF BREAST: ICD-10-CM

## 2017-07-06 PROCEDURE — 99214 OFFICE O/P EST MOD 30 MIN: CPT | Performed by: INTERNAL MEDICINE

## 2017-07-06 RX ORDER — PNV NO.95/FERROUS FUM/FOLIC AC 28MG-0.8MG
TABLET ORAL
Qty: 30 TAB | Refills: 3 | Status: SHIPPED | OUTPATIENT
Start: 2017-07-06 | End: 2018-05-10

## 2017-07-06 RX ORDER — RALOXIFENE HYDROCHLORIDE 60 MG/1
60 TABLET, FILM COATED ORAL DAILY
Qty: 90 TAB | Refills: 5 | Status: SHIPPED | OUTPATIENT
Start: 2017-07-06 | End: 2018-06-01

## 2017-07-06 ASSESSMENT — PAIN SCALES - GENERAL: PAINLEVEL: 2=MINIMAL-SLIGHT

## 2017-07-06 NOTE — MR AVS SNAPSHOT
Yeseniaperri Huff   2017 11:20 AM   Office Visit   MRN: [id number]    Department:  [facility]   Dept Phone:  [phone]    Description:  Female : 1955   Provider:  Reagan Herzog M.D.           Reason for Visit     New Patient           Allergies as of 2017     Allergen Noted Reactions    Azithromycin 2014       Heart racing    Ciprofloxacin Hcl 2016       Clindamycin 2014       Gabapentin 2017   Itching    Itching on back of head.    Lyrica 2014       Pcn [Penicillins] 2013       Valtrex [Valacyclovir Hcl] 2014       Vicodin [Hydrocodone-Acetaminophen] 2013         You were diagnosed with     Atypical lobular hyperplasia of breast   [867391]         Vital Signs     Blood Pressure Pulse Temperature Respirations Height Oxygen Saturation    124/82 mmHg 69 36.8 °C (98.2 °F) 14 1.524 m (5') 97%    Smoking Status                   Former Smoker           Basic Information     Date Of Birth Sex Race Ethnicity Preferred Language    1955 Female White Non- English      Your appointments     Aug 15, 2017 10:40 AM   ANNUAL EXAM PREVENTATIVE with Catrina Khan M.D.   [facility]    [address] [facility] [address] [phone]            Sep 12, 2017 10:00 AM   NEW TO YOU with Ana Zuluaga PA-C   [facility] (--)    [address] [id number] [phone]            2018 11:40 AM   ONCOLOGY EST PATIENT 30 MIN with Reagan Herzog M.D.   [facility] (--)    [address], [address]  [facility] [address] [phone]              Problem List              ICD-10-CM Priority Class Noted - Resolved    HTN (hypertension) I10   10/26/2010 - Present    Gastroesophageal reflux disease without esophagitis K21.9   10/29/2015 - Present    IBS (irritable bowel syndrome) K58.9   10/29/2015 - Present    Hiatal hernia K44.9   10/29/2015 -  Present    Hyperlipidemia E78.5   10/29/2015 - Present    Elevated liver enzymes R74.8   11/18/2015 - Present    Vitamin D deficiency E55.9   11/18/2015 - Present    Skin lesion L98.9   2/18/2016 - Present    Tendinitis M77.9   3/16/2016 - Present    Abnormal mammogram R92.8   3/16/2016 - Present    Depression F32.9   5/19/2016 - Present    Elevated TSH R94.6   10/4/2016 - Present    Lobular hyperplasia, atypical, breast N60.99   12/21/2016 - Present    Atypical lobular hyperplasia of breast N60.99   1/11/2017 - Present    Gastroparesis K31.84   1/12/2017 - Present    Fungal dermatitis B36.9   5/11/2017 - Present    Screening for osteoporosis Z13.820   5/11/2017 - Present      Health Maintenance        Date Due Completion Dates    IMM DTaP/Tdap/Td Vaccine (1 - Tdap) 4/23/1976 ---    PAP SMEAR 8/28/2016 8/28/2013 (Declined)    Override on 8/28/2013: Patient Declined    MAMMOGRAM 3/9/2017 3/9/2016    IMM ZOSTER VACCINE 4/23/2017 ---    IMM INFLUENZA (1) 9/1/2017 10/22/2013    COLONOSCOPY 8/28/2023 8/28/2013 (Declined)    Override on 8/28/2013: Patient Declined            Current Immunizations     Influenza TIV (IM) 10/22/2013    Pneumococcal polysaccharide vaccine (PPSV-23) 3/27/2014 10:23 PM      Below and/or attached are the medications your provider expects you to take. Review all of your home medications and newly ordered medications with your provider and/or pharmacist. Follow medication instructions as directed by your provider and/or pharmacist. Please keep your medication list with you and share with your provider. Update the information when medications are discontinued, doses are changed, or new medications (including over-the-counter products) are added; and carry medication information at all times in the event of emergency situations     Allergies:  AZITHROMYCIN - (reactions not documented)     CIPROFLOXACIN HCL - (reactions not documented)     CLINDAMYCIN - (reactions not documented)     GABAPENTIN -  Itching     LYRICA - (reactions not documented)     PCN - (reactions not documented)     VALTREX - (reactions not documented)     VICODIN - (reactions not documented)               Medications  Valid as of: July 06, 2017 - 12:17 PM    Generic Name Brand Name Tablet Size Instructions for use    Aspirin (Tab) aspirin 81 MG Take 4 Tabs by mouth every day.        Benzonatate (Cap) TESSALON 200 MG Take 1 Cap by mouth 3 times a day as needed for Cough.        Calcium Carbonate Antacid   Take  by mouth.        Cholecalciferol (Tab) cholecalciferol 1000 UNIT Take 1,000 Units by mouth every day.        Dicyclomine HCl (Cap) BENTYL 10 MG Take 10 mg by mouth 4 Times a Day,Before Meals and at Bedtime.        DiphenhydrAMINE HCl (Sleep)   Take  by mouth.        Doxycycline Hyclate (Tab) VIBRAMYCIN 100 MG Take 1 Tab by mouth 2 times a day.        Ferrous Sulfate (Tab) Iron 325 (65 FE) MG 1 tab daily        Fluticasone Propionate (Suspension) FLONASE 50 MCG/ACT Spray 1 Spray in nose 2 times a day.        Fluticasone Propionate (Suspension) FLONASE 50 MCG/ACT PLACE ONE SPRAY IN EACH NOSTRIL ONCE DAILY        Gabapentin (Cap) NEURONTIN 100 MG Take one tab qhs x 2 days, then 2 tabs qhs and then 3 tab qhs        Guaifenesin-Codeine (Solution) ROBITUSSIN -10 mg/5mL Take 5 mL by mouth at bedtime as needed for Cough.        Ketoconazole (Cream) NIZORAL 2 % Apply liberally to affected skin once a day        Lisinopril (Tab) PRINIVIL 20 MG TAKE ONE TABLET BY MOUTH EVERY NIGHT AT BEDTIME        Lisinopril-Hydrochlorothiazide (Tab) PRINZIDE, ZESTORETIC 20-25 MG TAKE ONE TABLET BY MOUTH DAILY        Metoclopramide HCl (Tab) REGLAN 5 MG Take 1 Tab by mouth 3 times a day before meals.        Metoprolol Tartrate (Tab) LOPRESSOR 25 MG Take 1 Tab by mouth 2 Times a Day.        Omeprazole (CAPSULE DELAYED RELEASE) PRILOSEC 20 MG TAKE ONE CAPSULE BY MOUTH TWICE A DAY        Ondansetron (TABLET DISPERSIBLE) ZOFRAN ODT 8 MG DISSOLVE ONE TABLET  BY MOUTH EVERY 8 HOURS AS NEEDED FOR NAUSEA AND VOMITING        Raloxifene HCl (Tab) EVISTA 60 MG Take 1 Tab by mouth every day.        .                 Medicines prescribed today were sent to:     [facility] #[id number] - [place] - [address]    [address] [place] [address]    Phone: [phone] Fax: [phone]    Open 24 Hours?: No      Medication refill instructions:       If your prescription bottle indicates you have medication refills left, it is not necessary to call your provider’s office. Please contact your pharmacy and they will refill your medication.    If your prescription bottle indicates you do not have any refills left, you may request refills at any time through one of the following ways: The online [employer] system (except Urgent Care), by calling your provider’s office, or by asking your pharmacy to contact your provider’s office with a refill request. Medication refills are processed only during regular business hours and may not be available until the next business day. Your provider may request additional information or to have a follow-up visit with you prior to refilling your medication.   *Please Note: Medication refills are assigned a new Rx number when refilled electronically. Your pharmacy may indicate that no refills were authorized even though a new prescription for the same medication is available at the pharmacy. Please request the medicine by name with the pharmacy before contacting your provider for a refill.           [employer] Access Code: Activation code not generated  Current [employer] Status: Active

## 2017-07-06 NOTE — PROGRESS NOTES
Date of visit: 7/6/2017  11:49 AM      Diagnosis: left breast atypical lobular hyperplasia and flat epithelial atypia     Chief Complaint: She is here for follow-up visit and to establish care with me.    History of Presenting Illness:  Yesenia Huff is a 61 y.o. Female diagnosed in 4/2016 with left breast atypical lobular hyperplasia and flat epithelial changes. She was diagnosed secondary to an abnormal mammogram which showed changes in the left breast with faint calcifications and 2 oval masses in the posterior left breast. A 4 mm lesion in the right breast as well. Biopsy of the left breast calcifications were positive for flat epithelial atypia with associated calcification and focal atypical lobular hyperplasia.     She was initially seen by Dr. Wright and was started on tamoxifen in 4/2016. She was unable to tolerate it secondary to multiple issues and discontinued therapy in 8/2016. She had improvement in her symptoms. 9/2016 she was then started Arimidex and has tolerated better. She however had issues with nausea, vomiting as well as hot flashes. She also had atypical symptoms such as blurred vision, dizziness as well as reflux symptoms.     She established care with Dr. Cruz recommended starting Raloxifene in 11/2016. Her last mammogram was in 11/2016 which did not show any concerning findings. She is here for follow-up visit and is accompanied by her daughter was present in the room. She has been fairly stable since her last visit. She has been tolerating the raloxifene well. She continues to have hot flashes. She did not tolerate the gabapentin/Effexor     Past Medical History:      Past Medical History   Diagnosis Date   • HTN (hypertension)    • Insomnia    • Pneumonia    • IBS (irritable bowel syndrome)    • GERD (gastroesophageal reflux disease)    • Hiatal hernia 10/29/2015   • Vitamin D deficiency 11/18/2015   • Trigger finger of right hand 3/16/2016   • Abnormal mammogram 3/16/2016        Past surgical history:     No past surgical history on file.    Allergies:       Azithromycin; Ciprofloxacin hcl; Clindamycin; Gabapentin; Lyrica; Pcn; Valtrex; and Vicodin    Medications:         Current Outpatient Prescriptions   Medication Sig Dispense Refill   • lisinopril (PRINIVIL) 20 MG Tab TAKE ONE TABLET BY MOUTH EVERY NIGHT AT BEDTIME 90 Tab 3   • ondansetron (ZOFRAN ODT) 8 MG TABLET DISPERSIBLE DISSOLVE ONE TABLET BY MOUTH EVERY 8 HOURS AS NEEDED FOR NAUSEA AND VOMITING 30 Tab 3   • ketoconazole (NIZORAL) 2 % Cream Apply liberally to affected skin once a day 1 Tube 5   • fluticasone (FLONASE) 50 MCG/ACT nasal spray PLACE ONE SPRAY IN EACH NOSTRIL ONCE DAILY 1 Bottle 1   • raloxifene (EVISTA) 60 MG Tab Take 1 Tab by mouth every day. 30 Tab 5   • benzonatate (TESSALON) 200 MG capsule Take 1 Cap by mouth 3 times a day as needed for Cough. 30 Cap 0   • doxycycline (VIBRAMYCIN) 100 MG Tab Take 1 Tab by mouth 2 times a day. 20 Tab 0   • guaifenesin-codeine (ROBITUSSIN AC) Solution oral solution Take 5 mL by mouth at bedtime as needed for Cough. 120 mL 0   • fluticasone (FLONASE) 50 MCG/ACT nasal spray Spray 1 Spray in nose 2 times a day. 1 Bottle 0   • omeprazole (PRILOSEC) 20 MG delayed-release capsule TAKE ONE CAPSULE BY MOUTH TWICE A  Cap 2   • metoprolol (LOPRESSOR) 25 MG Tab Take 1 Tab by mouth 2 Times a Day. 180 Tab 1   • lisinopril-hydrochlorothiazide (PRINZIDE, ZESTORETIC) 20-25 MG per tablet TAKE ONE TABLET BY MOUTH DAILY 90 Tab 2   • DiphenhydrAMINE HCl, Sleep, (UNISOM SLEEPGELS PO) Take  by mouth.     • Calcium Carbonate Antacid (DILAN-SELTZER ANTACID PO) Take  by mouth.     • metoclopramide (REGLAN) 5 MG tablet Take 1 Tab by mouth 3 times a day before meals. 120 Tab 2   • gabapentin (NEURONTIN) 100 MG Cap Take one tab qhs x 2 days, then 2 tabs qhs and then 3 tab qhs 60 Cap 1   • vitamin D (CHOLECALCIFEROL) 1000 UNIT Tab Take 1,000 Units by mouth every day.     • dicyclomine (BENTYL) 10  MG Cap Take 10 mg by mouth 4 Times a Day,Before Meals and at Bedtime.     • aspirin 81 MG tablet Take 4 Tabs by mouth every day. 100 Tab 3     No current facility-administered medications for this visit.         Social History:     Social History     Social History   • Marital Status: Single     Spouse Name: N/A   • Number of Children: N/A   • Years of Education: N/A     Occupational History   • Not on file.     Social History Main Topics   • Smoking status: Former Smoker   • Smokeless tobacco: Never Used   • Alcohol Use: No   • Drug Use: No   • Sexual Activity: Not on file     Other Topics Concern   • Not on file     Social History Narrative       Family History:    No family history on file.    Review of Systems:  All other review of systems are negative except what was mentioned above in the HPI.    Constitutional: Negative for fever, chills, weight loss and malaise/fatigue.   positive for hot flashes  HEENT: No new auditory or visual complaints. No sore throat and neck pain.     Respiratory: Negative for cough, sputum production, shortness of breath and wheezing.    Cardiovascular: Negative for chest pain, palpitations, orthopnea and leg swelling.    Gastrointestinal: Negative for heartburn, nausea, vomiting and abdominal pain.    Genitourinary: Negative for dysuria, hematuria    Musculoskeletal: No new arthralgias or myalgias   Skin: Negative for rash and itching.    Neurological: Negative for focal weakness and headaches.    Endo/Heme/Allergies: No abnormal bleed/bruise.    Psychiatric/Behavioral: No new depression/anxiety.    Physical Exam:  Vitals: /82 mmHg  Pulse 69  Temp(Src) 36.8 °C (98.2 °F)  Resp 14  Ht 1.524 m (5')  SpO2 97%    General: Not in acute distress, alert and oriented x 3  HEENT: No pallor, icterus. Oropharynx clear.   Neck: Supple, no palpable masses.  Lymph nodes: No palpable cervical, supraclavicular, axillary or inguinal lymphadenopathy.    CVS: regular rate and rhythm, no rubs  or gallops  RESP: Clear to auscultate bilaterally, no wheezing or crackles.   ABD: Soft, non tender, non distended, positive bowel sounds, no palpable organomegaly  EXT: No edema or cyanosis  CNS: Alert and oriented x3, No focal deficits.  Skin- No rash      Labs:   No visits with results within 1 Week(s) from this visit.  Latest known visit with results is:    Hospital Outpatient Visit on 05/24/2017   Component Date Value Ref Range Status   • WBC 05/24/2017 4.9  4.8 - 10.8 K/uL Final   • RBC 05/24/2017 4.43  4.20 - 5.40 M/uL Final   • Hemoglobin 05/24/2017 11.8* 12.0 - 16.0 g/dL Final   • Hematocrit 05/24/2017 37.5  37.0 - 47.0 % Final   • MCV 05/24/2017 84.7  81.4 - 97.8 fL Final   • MCH 05/24/2017 26.6* 27.0 - 33.0 pg Final   • MCHC 05/24/2017 31.5* 33.6 - 35.0 g/dL Final   • RDW 05/24/2017 44.6  35.9 - 50.0 fL Final   • Platelet Count 05/24/2017 145* 164 - 446 K/uL Final   • MPV 05/24/2017 12.2  9.0 - 12.9 fL Final   • Neutrophils-Polys 05/24/2017 63.40  44.00 - 72.00 % Final   • Lymphocytes 05/24/2017 29.70  22.00 - 41.00 % Final   • Monocytes 05/24/2017 4.70  0.00 - 13.40 % Final   • Eosinophils 05/24/2017 1.40  0.00 - 6.90 % Final   • Basophils 05/24/2017 0.40  0.00 - 1.80 % Final   • Immature Granulocytes 05/24/2017 0.40  0.00 - 0.90 % Final   • Nucleated RBC 05/24/2017 0.00   Final   • Neutrophils (Absolute) 05/24/2017 3.12  2.00 - 7.15 K/uL Final    Includes immature neutrophils, if present.   • Lymphs (Absolute) 05/24/2017 1.46  1.00 - 4.80 K/uL Final   • Monos (Absolute) 05/24/2017 0.23  0.00 - 0.85 K/uL Final   • Eos (Absolute) 05/24/2017 0.07  0.00 - 0.51 K/uL Final   • Baso (Absolute) 05/24/2017 0.02  0.00 - 0.12 K/uL Final   • Immature Granulocytes (abs) 05/24/2017 0.02  0.00 - 0.11 K/uL Final   • NRBC (Absolute) 05/24/2017 0.00   Final   • Sodium 05/24/2017 136  135 - 145 mmol/L Final   • Potassium 05/24/2017 4.1  3.6 - 5.5 mmol/L Final   • Chloride 05/24/2017 102  96 - 112 mmol/L Final   • Co2  05/24/2017 25  20 - 33 mmol/L Final   • Anion Gap 05/24/2017 9.0  0.0 - 11.9 Final   • Glucose 05/24/2017 94  65 - 99 mg/dL Final   • Bun 05/24/2017 13  8 - 22 mg/dL Final   • Creatinine 05/24/2017 0.64  0.50 - 1.40 mg/dL Final   • Calcium 05/24/2017 10.1  8.5 - 10.5 mg/dL Final   • AST(SGOT) 05/24/2017 46* 12 - 45 U/L Final   • ALT(SGPT) 05/24/2017 32  2 - 50 U/L Final   • Alkaline Phosphatase 05/24/2017 56  30 - 99 U/L Final   • Total Bilirubin 05/24/2017 0.7  0.1 - 1.5 mg/dL Final   • Albumin 05/24/2017 4.0  3.2 - 4.9 g/dL Final   • Total Protein 05/24/2017 7.9  6.0 - 8.2 g/dL Final   • Globulin 05/24/2017 3.9* 1.9 - 3.5 g/dL Final   • A-G Ratio 05/24/2017 1.0   Final   • GFR If  05/24/2017 >60  >60 mL/min/1.73 m 2 Final   • GFR If Non  05/24/2017 >60  >60 mL/min/1.73 m 2 Final             Assessment and Plan:    1. Left breast atypical lobular hyperplasia and flat epithelial atypia: Explained to her that these are markers  . For increased risk of subsequent breast carcinoma and not invasive cancerous lesions. Initially she was started on tamoxifen but was unable to tolerate it. She was then on Aromasin which had some side effects. She most recently since 11/2016 has been on raloxifene  She has been on chemoprophylaxis on and off since her diagnosis. Plan is to treat her for at least 5 years of chemoprophylaxis until mid 2021. She is continued on a yearly mammograms. Her next mammogram is due in 11/2017.  2. Hot flashes: He was unable to tolerate Effexor/gabapentin in the past. This is manageable according to her.     I have extensively reviewed her prior medical records as part of establishing care with me and formulated the plan. Total time spent TODAY 35 minutes  She agreed and verbalized her agreement and understanding with the current plan.  I answered all questions and concerns she has at this time         Please note that this dictation was created using voice recognition  software. I have made every reasonable attempt to correct obvious errors, but I expect that there are errors of grammar and possibly content that I did not discover before finalizing the note.      SIGNATURES:  Reagan Herzog    CC:  Catrina Khan M.D.  No ref. provider found

## 2017-08-03 NOTE — TELEPHONE ENCOUNTER
Refill X 6 months, sent to pharmacy.Pt. Seen in the last 6 months per protocol.   Lab Results   Component Value Date/Time    SODIUM 136 05/24/2017 09:00 AM    POTASSIUM 4.1 05/24/2017 09:00 AM    CHLORIDE 102 05/24/2017 09:00 AM    CO2 25 05/24/2017 09:00 AM    GLUCOSE 94 05/24/2017 09:00 AM    BUN 13 05/24/2017 09:00 AM    CREATININE 0.64 05/24/2017 09:00 AM    BUN-CREATININE RATIO 22 10/22/2010 08:20 AM    GLOM FILT RATE, EST >59 10/22/2010 08:20 AM

## 2017-09-06 ENCOUNTER — HOSPITAL ENCOUNTER (OUTPATIENT)
Facility: MEDICAL CENTER | Age: 62
End: 2017-09-06
Attending: PHYSICIAN ASSISTANT
Payer: COMMERCIAL

## 2017-09-06 ENCOUNTER — APPOINTMENT (OUTPATIENT)
Dept: MEDICAL GROUP | Facility: CLINIC | Age: 62
End: 2017-09-06
Payer: COMMERCIAL

## 2017-09-06 LAB
AMBIGUOUS DTTM AMBI4: NORMAL
CHOLEST SERPL-MCNC: 203 MG/DL (ref 100–199)
HDLC SERPL-MCNC: 33 MG/DL
LDLC SERPL CALC-MCNC: 142 MG/DL
TRIGL SERPL-MCNC: 139 MG/DL (ref 0–149)

## 2017-09-06 PROCEDURE — 80061 LIPID PANEL: CPT

## 2017-09-07 NOTE — PROGRESS NOTES
I reviewed labs. Although your cholesterol is improved compared to 1 year ago, it is still out of normal range. Please keep your appointment coming up on the 12th.

## 2017-09-12 ENCOUNTER — OFFICE VISIT (OUTPATIENT)
Dept: MEDICAL GROUP | Facility: CLINIC | Age: 62
End: 2017-09-12
Payer: COMMERCIAL

## 2017-09-12 VITALS
SYSTOLIC BLOOD PRESSURE: 140 MMHG | WEIGHT: 185 LBS | HEART RATE: 72 BPM | OXYGEN SATURATION: 97 % | TEMPERATURE: 98.2 F | RESPIRATION RATE: 16 BRPM | HEIGHT: 60 IN | BODY MASS INDEX: 36.32 KG/M2 | DIASTOLIC BLOOD PRESSURE: 84 MMHG

## 2017-09-12 DIAGNOSIS — Z23 NEED FOR VACCINATION: ICD-10-CM

## 2017-09-12 DIAGNOSIS — K58.2 IRRITABLE BOWEL SYNDROME WITH BOTH CONSTIPATION AND DIARRHEA: ICD-10-CM

## 2017-09-12 DIAGNOSIS — Z13.6 SCREENING FOR CARDIOVASCULAR CONDITION: ICD-10-CM

## 2017-09-12 DIAGNOSIS — J30.2 SEASONAL ALLERGIC RHINITIS, UNSPECIFIED ALLERGIC RHINITIS TRIGGER: ICD-10-CM

## 2017-09-12 DIAGNOSIS — E78.5 HYPERLIPIDEMIA, UNSPECIFIED HYPERLIPIDEMIA TYPE: ICD-10-CM

## 2017-09-12 DIAGNOSIS — Z76.89 ENCOUNTER TO ESTABLISH CARE: ICD-10-CM

## 2017-09-12 DIAGNOSIS — E66.9 OBESITY (BMI 30-39.9): ICD-10-CM

## 2017-09-12 DIAGNOSIS — N60.99 ATYPICAL LOBULAR HYPERPLASIA OF BREAST: ICD-10-CM

## 2017-09-12 DIAGNOSIS — K31.84 GASTROPARESIS: ICD-10-CM

## 2017-09-12 PROBLEM — Z13.820 SCREENING FOR OSTEOPOROSIS: Status: RESOLVED | Noted: 2017-05-11 | Resolved: 2017-09-12

## 2017-09-12 PROCEDURE — 99214 OFFICE O/P EST MOD 30 MIN: CPT | Mod: 25 | Performed by: PHYSICIAN ASSISTANT

## 2017-09-12 PROCEDURE — 90686 IIV4 VACC NO PRSV 0.5 ML IM: CPT | Performed by: PHYSICIAN ASSISTANT

## 2017-09-12 PROCEDURE — 90471 IMMUNIZATION ADMIN: CPT | Performed by: PHYSICIAN ASSISTANT

## 2017-09-12 RX ORDER — HYOSCYAMINE SULFATE 0.125 MG
125 TABLET ORAL EVERY 4 HOURS PRN
Qty: 60 TAB | Refills: 2 | Status: SHIPPED | OUTPATIENT
Start: 2017-09-12 | End: 2018-06-01

## 2017-09-12 RX ORDER — FLUTICASONE PROPIONATE 50 MCG
2 SPRAY, SUSPENSION (ML) NASAL DAILY
Qty: 1 BOTTLE | Refills: 1 | Status: SHIPPED | OUTPATIENT
Start: 2017-09-12 | End: 2018-06-01

## 2017-09-12 RX ORDER — DICYCLOMINE HYDROCHLORIDE 10 MG/1
10 CAPSULE ORAL
Qty: 120 CAP | Refills: 1 | Status: SHIPPED | OUTPATIENT
Start: 2017-09-12 | End: 2018-06-01

## 2017-09-12 NOTE — PROGRESS NOTES
Chief Complaint   Patient presents with   • Labs Only       HISTORY OF PRESENT ILLNESS: Patient is a 62 y.o. female established patient who presents today for evaluation and management of:    Atypical lobular hyperplasia of breast  Followed closely by oncology for this. She has not had any recent problems and gets mammograms regularly. She continues to take raloxifene     Hyperlipidemia  This is a continued problem. The patient has been controlling this with diet and states that since starting raloxifene, her lipid levels have come down quite a bit. She does not exercise.     Seasonal allergic rhinitis  Patient has been using flonase for symptoms every morning during allergy season. She states her rhinitis is well controlled on this and would like a refill. It has not changed her blood pressure, heart rate or cause nasal polyps or bloody noses.     Encounter to establish care  Patient would like to establish with a practitioner in the town where she lives.        Patient Active Problem List    Diagnosis Date Noted   • Seasonal allergic rhinitis 09/12/2017   • Obesity (BMI 30-39.9) 09/12/2017   • Encounter to establish care 09/12/2017   • Fungal dermatitis 05/11/2017   • Atypical lobular hyperplasia of breast 01/11/2017   • Lobular hyperplasia, atypical, breast 12/21/2016   • Depression 05/19/2016   • Abnormal mammogram 03/16/2016   • Skin lesion 02/18/2016   • Gastroesophageal reflux disease without esophagitis 10/29/2015   • IBS (irritable bowel syndrome) 10/29/2015   • Hiatal hernia 10/29/2015   • Hyperlipidemia 10/29/2015   • HTN (hypertension) 10/26/2010       Allergies:Azithromycin; Ciprofloxacin hcl; Clindamycin; Gabapentin; Lyrica; Pcn [penicillins]; Valtrex [valacyclovir hcl]; and Vicodin [hydrocodone-acetaminophen]    Current Outpatient Prescriptions   Medication Sig Dispense Refill   • fluticasone (FLONASE) 50 MCG/ACT nasal spray Spray 2 Sprays in nose every day. 1 Bottle 1   • dicyclomine (BENTYL) 10 MG  Cap Take 1 Cap by mouth 4 Times a Day,Before Meals and at Bedtime. 120 Cap 1   • hyoscyamine (ANASPAZ, LEVSIN) 0.125 MG tablet Take 1 Tab by mouth every four hours as needed for Cramping. 60 Tab 2   • metoprolol (LOPRESSOR) 25 MG Tab TAKE ONE TABLET BY MOUTH TWICE A  Tab 1   • Ferrous Sulfate (IRON) 325 (65 FE) MG Tab 1 tab daily 30 Tab 3   • raloxifene (EVISTA) 60 MG Tab Take 1 Tab by mouth every day. 90 Tab 5   • lisinopril (PRINIVIL) 20 MG Tab TAKE ONE TABLET BY MOUTH EVERY NIGHT AT BEDTIME 90 Tab 3   • ondansetron (ZOFRAN ODT) 8 MG TABLET DISPERSIBLE DISSOLVE ONE TABLET BY MOUTH EVERY 8 HOURS AS NEEDED FOR NAUSEA AND VOMITING 30 Tab 3   • ketoconazole (NIZORAL) 2 % Cream Apply liberally to affected skin once a day 1 Tube 5   • omeprazole (PRILOSEC) 20 MG delayed-release capsule TAKE ONE CAPSULE BY MOUTH TWICE A  Cap 2   • lisinopril-hydrochlorothiazide (PRINZIDE, ZESTORETIC) 20-25 MG per tablet TAKE ONE TABLET BY MOUTH DAILY 90 Tab 2   • DiphenhydrAMINE HCl, Sleep, (UNISOM SLEEPGELS PO) Take  by mouth.     • Calcium Carbonate Antacid (DLIAN-SELTZER ANTACID PO) Take  by mouth.     • metoclopramide (REGLAN) 5 MG tablet Take 1 Tab by mouth 3 times a day before meals. 120 Tab 2   • vitamin D (CHOLECALCIFEROL) 1000 UNIT Tab Take 1,000 Units by mouth every day.     • aspirin 81 MG tablet Take 4 Tabs by mouth every day. 100 Tab 3     No current facility-administered medications for this visit.        Social History   Substance Use Topics   • Smoking status: Former Smoker   • Smokeless tobacco: Never Used   • Alcohol use 0.0 oz/week      Comment: rarely, 3 times per year       Family Status   Relation Status   • Mother    • Father    • Sister Alive   • Brother Alive   No family history on file.    Review of Systems: See HPI above.   Constitutional: Negative for fever, chills, weight loss and malaise/fatigue.   HENT: Negative for ear pain, nosebleeds, sore throat and neck pain.    Eyes:  Negative for blurred vision.   Respiratory: Negative for cough, sputum production, shortness of breath and wheezing.    Cardiovascular: Negative for chest pain, palpitations, orthopnea and leg swelling.   Gastrointestinal: Negative for nausea, vomiting and abdominal pain.   Genitourinary: Negative for dysuria, urgency and frequency.   Musculoskeletal: Negative for myalgias, back pain and joint pain.   Skin: Negative for rash and itching.   Neurological: Negative for dizziness, tingling, tremors, sensory change, focal weakness and headaches.   Endo/Heme/Allergies: Does not bruise/bleed easily.   Psychiatric/Behavioral: Negative for depression, suicidal ideas and memory loss.  The patient is not nervous/anxious and does not have insomnia.      Exam:  Blood pressure 140/84, pulse 72, temperature 36.8 °C (98.2 °F), resp. rate 16, height 1.524 m (5'), weight 83.9 kg (185 lb), SpO2 97 %.  Body mass index is 36.13 kg/m².  General:  Obese female in NAD  Head: is grossly normal.  Neck: Supple without masses. Thyroid is not visibly enlarged.  Pulmonary: Clear to ausculation. Normal effort. No rales, ronchi, or wheezing.  Cardiovascular: Regular rate and rhythm without murmur. Carotid and radial pulses are intact and equal bilaterally.  Extremities: no clubbing, cyanosis, or edema.    Medical decision-making and discussion:  1. Gastroparesis  Due to current shortage of Bentyl, patient is prescribed 0.125 mg hyoscyamine tab to take 4 times per day as needed  - dicyclomine (BENTYL) 10 MG Cap; Take 1 Cap by mouth 4 Times a Day,Before Meals and at Bedtime.  Dispense: 120 Cap; Refill: 1    2. Seasonal allergic rhinitis, unspecified allergic rhinitis trigger  - fluticasone (FLONASE) 50 MCG/ACT nasal spray; Spray 2 Sprays in nose every day.  Dispense: 1 Bottle; Refill: 1    3. Obesity (BMI 30-39.9)  - Patient identified as having weight management issue.  Appropriate orders and counseling given.  - CBC WITHOUT DIFFERENTIAL; Future  -  CMP (12)  - LIPID PANEL  - IRON/TOTAL IRON BIND; Future    4. Need for vaccination  - Flu Quad Inj >3 Year Pre-Filled PF    5. Atypical lobular hyperplasia of breast  Followed by oncology.   - CBC WITHOUT DIFFERENTIAL; Future    6. Irritable bowel syndrome with both constipation and diarrhea  - IRON/TOTAL IRON BIND; Future  - hyoscyamine (ANASPAZ, LEVSIN) 0.125 MG tablet; Take 1 Tab by mouth every four hours as needed for Cramping.  Dispense: 60 Tab; Refill: 2    7. Screening for cardiovascular condition    - LIPID PANEL    8. Hyperlipidemia, unspecified hyperlipidemia type  Diet recommended.     9. Encounter to establish care  Patient will be followed by me at this clinic.       Please note that this dictation was created using voice recognition software. I have made every reasonable attempt to correct obvious errors, but I expect that there are errors of grammar and possibly content that I did not discover before finalizing the note.      Return in about 6 months (around 3/12/2018) for recheck lipids, WBC count.

## 2017-09-12 NOTE — ASSESSMENT & PLAN NOTE
Patient has been using flonase for symptoms every morning during allergy season. She states her rhinitis is well controlled on this and would like a refill. It has not changed her blood pressure, heart rate or cause nasal polyps or bloody noses.

## 2017-09-12 NOTE — ASSESSMENT & PLAN NOTE
This is a continued problem. The patient has been controlling this with diet and states that since starting raloxifene, her lipid levels have come down quite a bit. She does not exercise.

## 2017-09-12 NOTE — ASSESSMENT & PLAN NOTE
Followed closely by oncology for this. She has not had any recent problems and gets mammograms regularly. She continues to take raloxifene

## 2017-10-23 ENCOUNTER — OFFICE VISIT (OUTPATIENT)
Dept: URGENT CARE | Facility: PHYSICIAN GROUP | Age: 62
End: 2017-10-23
Payer: COMMERCIAL

## 2017-10-23 VITALS
OXYGEN SATURATION: 97 % | DIASTOLIC BLOOD PRESSURE: 80 MMHG | HEIGHT: 60 IN | RESPIRATION RATE: 16 BRPM | TEMPERATURE: 98.2 F | SYSTOLIC BLOOD PRESSURE: 130 MMHG | HEART RATE: 79 BPM

## 2017-10-23 DIAGNOSIS — R06.02 SOB (SHORTNESS OF BREATH): ICD-10-CM

## 2017-10-23 DIAGNOSIS — J22 LOWER RESP. TRACT INFECTION: ICD-10-CM

## 2017-10-23 DIAGNOSIS — R06.2 WHEEZE: ICD-10-CM

## 2017-10-23 PROCEDURE — 99214 OFFICE O/P EST MOD 30 MIN: CPT | Performed by: PHYSICIAN ASSISTANT

## 2017-10-23 RX ORDER — ALBUTEROL SULFATE 90 UG/1
2 AEROSOL, METERED RESPIRATORY (INHALATION) EVERY 6 HOURS PRN
Qty: 8.5 G | Refills: 0 | Status: SHIPPED | OUTPATIENT
Start: 2017-10-23 | End: 2018-06-01

## 2017-10-23 RX ORDER — DOXYCYCLINE HYCLATE 100 MG
100 TABLET ORAL 2 TIMES DAILY
Qty: 20 TAB | Refills: 0 | Status: SHIPPED | OUTPATIENT
Start: 2017-10-23 | End: 2018-03-13

## 2017-10-23 RX ORDER — BENZONATATE 200 MG/1
200 CAPSULE ORAL 3 TIMES DAILY PRN
Qty: 60 CAP | Refills: 0 | Status: SHIPPED | OUTPATIENT
Start: 2017-10-23 | End: 2018-03-16

## 2017-10-23 ASSESSMENT — PAIN SCALES - GENERAL: PAINLEVEL: NO PAIN

## 2017-10-24 ENCOUNTER — TELEPHONE (OUTPATIENT)
Dept: HEMATOLOGY ONCOLOGY | Facility: MEDICAL CENTER | Age: 62
End: 2017-10-24

## 2017-10-24 DIAGNOSIS — Z12.31 SCREENING MAMMOGRAM, ENCOUNTER FOR: ICD-10-CM

## 2017-10-24 NOTE — TELEPHONE ENCOUNTER
Patient Called Stated that she has been waiting for someone from the office to call her back regarding her appointment for her Mammo at [facility] and she hasn't but when I Look at her chart It didn't look like Dr Herzog has Order on yet ?

## 2017-10-24 NOTE — PROGRESS NOTES
Chief Complaint   Patient presents with   • Cough     x 3 weeks       HISTORY OF PRESENT ILLNESS: Patient is a 62 y.o. female who presents today for the following:    Patient comes in for evaluation with recurrent history of nonproductive cough. She states she had a fever in the beginning but has not had anything over the last 7-10 days. She did have some sputum production but has not had anything over the last 7-10 days. She denies ear pain, nasal congestion, and sore throat. She has not been able to sleep but states she always has insomnia. Patient is requesting Tessalon Perles for cough.    Patient Active Problem List    Diagnosis Date Noted   • Seasonal allergic rhinitis 09/12/2017   • Obesity (BMI 30-39.9) 09/12/2017   • Encounter to establish care 09/12/2017   • Fungal dermatitis 05/11/2017   • Atypical lobular hyperplasia of breast 01/11/2017   • Lobular hyperplasia, atypical, breast 12/21/2016   • Depression 05/19/2016   • Abnormal mammogram 03/16/2016   • Skin lesion 02/18/2016   • Gastroesophageal reflux disease without esophagitis 10/29/2015   • IBS (irritable bowel syndrome) 10/29/2015   • Hiatal hernia 10/29/2015   • Hyperlipidemia 10/29/2015   • HTN (hypertension) 10/26/2010       Allergies:Azithromycin; Ciprofloxacin hcl; Clindamycin; Gabapentin; Lyrica; Pcn [penicillins]; Valtrex [valacyclovir hcl]; and Vicodin [hydrocodone-acetaminophen]    Current Outpatient Prescriptions Ordered in [facility]   Medication Sig Dispense Refill   • doxycycline (VIBRAMYCIN) 100 MG Tab Take 1 Tab by mouth 2 times a day. 20 Tab 0   • albuterol 108 (90 Base) MCG/ACT Aero Soln inhalation aerosol Inhale 2 Puffs by mouth every 6 hours as needed for Shortness of Breath. 8.5 g 0   • benzonatate (TESSALON) 200 MG capsule Take 1 Cap by mouth 3 times a day as needed for Cough. 60 Cap 0   • fluticasone (FLONASE) 50 MCG/ACT nasal spray Spray 2 Sprays in nose every day. 1 Bottle 1   • metoprolol (LOPRESSOR) 25 MG Tab TAKE ONE TABLET BY  MOUTH TWICE A  Tab 1   • lisinopril (PRINIVIL) 20 MG Tab TAKE ONE TABLET BY MOUTH EVERY NIGHT AT BEDTIME 90 Tab 3   • omeprazole (PRILOSEC) 20 MG delayed-release capsule TAKE ONE CAPSULE BY MOUTH TWICE A  Cap 2   • lisinopril-hydrochlorothiazide (PRINZIDE, ZESTORETIC) 20-25 MG per tablet TAKE ONE TABLET BY MOUTH DAILY 90 Tab 2   • DiphenhydrAMINE HCl, Sleep, (UNISOM SLEEPGELS PO) Take  by mouth.     • vitamin D (CHOLECALCIFEROL) 1000 UNIT Tab Take 1,000 Units by mouth every day.     • aspirin 81 MG tablet Take 4 Tabs by mouth every day. 100 Tab 3   • dicyclomine (BENTYL) 10 MG Cap Take 1 Cap by mouth 4 Times a Day,Before Meals and at Bedtime. 120 Cap 1   • hyoscyamine (ANASPAZ, LEVSIN) 0.125 MG tablet Take 1 Tab by mouth every four hours as needed for Cramping. 60 Tab 2   • Ferrous Sulfate (IRON) 325 (65 FE) MG Tab 1 tab daily 30 Tab 3   • raloxifene (EVISTA) 60 MG Tab Take 1 Tab by mouth every day. 90 Tab 5   • ondansetron (ZOFRAN ODT) 8 MG TABLET DISPERSIBLE DISSOLVE ONE TABLET BY MOUTH EVERY 8 HOURS AS NEEDED FOR NAUSEA AND VOMITING 30 Tab 3   • ketoconazole (NIZORAL) 2 % Cream Apply liberally to affected skin once a day 1 Tube 5   • Calcium Carbonate Antacid (DILAN-SELTZER ANTACID PO) Take  by mouth.     • metoclopramide (REGLAN) 5 MG tablet Take 1 Tab by mouth 3 times a day before meals. 120 Tab 2     No current Epic-ordered facility-administered medications on file.        Past Medical History:   Diagnosis Date   • Abnormal mammogram 3/16/2016   • GERD (gastroesophageal reflux disease)    • Hiatal hernia 10/29/2015   • HTN (hypertension)    • IBS (irritable bowel syndrome)    • Insomnia    • Pneumonia    • Trigger finger of right hand 3/16/2016   • Vitamin D deficiency 11/18/2015       Social History   Substance Use Topics   • Smoking status: Former Smoker   • Smokeless tobacco: Never Used   • Alcohol use 0.0 oz/week      Comment: rarely, 3 times per year       Family Status   Relation Status   •  Mother    • Father    • Sister Alive   • Brother Alive   History reviewed. No pertinent family history.    ROS:    Review of Systems   Constitutional: Negative for fever, chills, weight loss and malaise/fatigue.   HENT: Negative for ear pain, nosebleeds, congestion, sore throat and neck pain.    Eyes: Negative for blurred vision.   Respiratory:Positive for cough, shortness of breath and wheezing.    Cardiovascular: Negative for chest pain, palpitations, orthopnea and leg swelling.   Gastrointestinal: Negative for heartburn, nausea, vomiting and abdominal pain.   Genitourinary: Negative for dysuria, urgency and frequency.       Exam:  Blood pressure 130/80, pulse 79, temperature 36.8 °C (98.2 °F), resp. rate 16, height 1.524 m (5'), SpO2 97 %.  General: Well developed, well nourished. No distress.  HEENT: Conjunctiva clear, lids without ptosis, PERRL/EOMI. Ears normal shape and contour, canals are clear bilaterally, tympanic membranes are benign. Nasal mucosa benign. Oropharynx is without erythema, edema or exudates. Reasonable dentition.  Pulmonary: Diffuse expiratory coarse wheezes.  Cardiovascular: Regular rate and rhythm without murmur. No edema.   Neurologic: Grossly nonfocal.  Lymph: No cervical lymphadenopathy noted.  Skin: Warm, dry, good turgor. No rashes in visible areas.   Psych: Normal mood. Alert and oriented x3. Judgment and insight is normal.    Assessment/Plan:  Patient declines steroid. She is asking for Tessalon Perles. Patient is allergic to multiple antibiotics. Advised that she try the inhaler and cough suppressant for the next 7-10 days, filling the antibiotic for worsening or persistent symptoms. Patient verbalizes understanding and agrees with plan of care.  1. Lower resp. tract infection  doxycycline (VIBRAMYCIN) 100 MG Tab    benzonatate (TESSALON) 200 MG capsule   2. SOB (shortness of breath)  albuterol 108 (90 Base) MCG/ACT Aero Soln inhalation aerosol   3. Wheeze   albuterol 108 (90 Base) MCG/ACT Aero Soln inhalation aerosol

## 2017-10-26 ENCOUNTER — TELEPHONE (OUTPATIENT)
Dept: HEMATOLOGY ONCOLOGY | Facility: MEDICAL CENTER | Age: 62
End: 2017-10-26

## 2017-10-26 NOTE — TELEPHONE ENCOUNTER
I spoke to patient and let her know when she is scheduled for her mammo.    Scheduled with Kelly at [facility]     November 17, 2017 check-in at 12:40 pm for an appointment at 1:00 pm.     [facility]  [address], [place]  Phone: [phone]

## 2017-10-26 NOTE — TELEPHONE ENCOUNTER
I left the patient a message to call back to let us know which [facility] location she would like to have her mammo done at.

## 2017-12-04 ENCOUNTER — TELEPHONE (OUTPATIENT)
Dept: HEMATOLOGY ONCOLOGY | Facility: MEDICAL CENTER | Age: 62
End: 2017-12-04

## 2017-12-04 NOTE — TELEPHONE ENCOUNTER
Left message for patient to call back to inform her I reschedule her follow up appointment with Dr. Herzog on 1/11/2017. Please confirm the new date and time with the patient and reschedule if need be.

## 2017-12-06 ENCOUNTER — TELEPHONE (OUTPATIENT)
Dept: HEMATOLOGY ONCOLOGY | Facility: MEDICAL CENTER | Age: 62
End: 2017-12-06

## 2017-12-14 RX ORDER — LISINOPRIL AND HYDROCHLOROTHIAZIDE 25; 20 MG/1; MG/1
TABLET ORAL
Qty: 90 TAB | Refills: 1 | Status: SHIPPED | OUTPATIENT
Start: 2017-12-14 | End: 2018-06-13 | Stop reason: SDUPTHER

## 2017-12-14 RX ORDER — OMEPRAZOLE 20 MG/1
CAPSULE, DELAYED RELEASE ORAL
Qty: 180 CAP | Refills: 1 | Status: SHIPPED | OUTPATIENT
Start: 2017-12-14 | End: 2018-07-23 | Stop reason: SDUPTHER

## 2018-01-10 DIAGNOSIS — N60.99 ATYPICAL LOBULAR HYPERPLASIA OF BREAST: ICD-10-CM

## 2018-01-10 DIAGNOSIS — R11.0 NAUSEA: ICD-10-CM

## 2018-01-10 RX ORDER — ONDANSETRON 4 MG/1
4 TABLET, FILM COATED ORAL EVERY 4 HOURS PRN
Qty: 30 TAB | Refills: 0 | Status: SHIPPED | OUTPATIENT
Start: 2018-01-10 | End: 2018-03-13 | Stop reason: SDUPTHER

## 2018-01-10 NOTE — PROGRESS NOTES
Refill request for Zofran received. Patient's authorization not allowing ODTs, so regular 4 mg tablets has been refilled to patient's pharmacy.

## 2018-01-24 ENCOUNTER — TELEPHONE (OUTPATIENT)
Dept: HEMATOLOGY ONCOLOGY | Facility: MEDICAL CENTER | Age: 63
End: 2018-01-24

## 2018-01-24 ENCOUNTER — OFFICE VISIT (OUTPATIENT)
Dept: HEMATOLOGY ONCOLOGY | Facility: MEDICAL CENTER | Age: 63
End: 2018-01-24
Payer: COMMERCIAL

## 2018-01-24 VITALS
HEIGHT: 60 IN | TEMPERATURE: 98.5 F | SYSTOLIC BLOOD PRESSURE: 122 MMHG | OXYGEN SATURATION: 98 % | RESPIRATION RATE: 16 BRPM | HEART RATE: 74 BPM | DIASTOLIC BLOOD PRESSURE: 88 MMHG

## 2018-01-24 DIAGNOSIS — N60.99 ATYPICAL LOBULAR HYPERPLASIA OF BREAST: ICD-10-CM

## 2018-01-24 PROCEDURE — 99213 OFFICE O/P EST LOW 20 MIN: CPT | Performed by: INTERNAL MEDICINE

## 2018-01-24 ASSESSMENT — PAIN SCALES - GENERAL: PAINLEVEL: 5=MODERATE PAIN

## 2018-01-24 NOTE — PROGRESS NOTES
Date of visit: 1/24/2018  11:02 AM      Chief Complaint- left breast atypical lobular hyperplasia and flat epithelial atypia      Chief Complaint: She is here for follow-up visit.      History of Presenting Illness:  Yesenia Huff is a 61 y.o. Female diagnosed in 4/2016 with left breast atypical lobular hyperplasia and flat epithelial changes. She was diagnosed secondary to an abnormal mammogram which showed changes in the left breast with faint calcifications and 2 oval masses in the posterior left breast. A 4 mm lesion in the right breast as well. Biopsy of the left breast calcifications were positive for flat epithelial atypia with associated calcification and focal atypical lobular hyperplasia.      She was initially seen by Dr. Wright and was started on tamoxifen in 4/2016. She was unable to tolerate it secondary to multiple issues and discontinued therapy in 8/2016. She had improvement in her symptoms. 9/2016 she was then started Arimidex and has tolerated better. She however had issues with nausea, vomiting as well as hot flashes. She also had atypical symptoms such as blurred vision, dizziness as well as reflux symptoms.      She established care with Dr. Cruz recommended starting Raloxifene in 11/2016. Her last mammogram was in 11/2016 which did not show any concerning findings. She is here for follow-up visit and is accompanied by her daughter was present in the room. She has been fairly stable since her last visit. She has been tolerating the raloxifene well. She continues to have hot flashes. She did not tolerate the gabapentin/Effexor .    10/2017-mammogram done at [facility] reportedly normal. New complaints except chronic hot flashes     Past Medical History:      Past Medical History:   Diagnosis Date   • Abnormal mammogram 3/16/2016   • GERD (gastroesophageal reflux disease)    • Hiatal hernia 10/29/2015   • HTN (hypertension)    • IBS (irritable bowel syndrome)    • Insomnia    • Pneumonia     • Trigger finger of right hand 3/16/2016   • Vitamin D deficiency 11/18/2015       Past surgical history:     No past surgical history on file.    Allergies:       Azithromycin; Ciprofloxacin hcl; Clindamycin; Gabapentin; Lyrica; Pcn [penicillins]; Valtrex [valacyclovir hcl]; and Vicodin [hydrocodone-acetaminophen]    Medications:         Current Outpatient Prescriptions   Medication Sig Dispense Refill   • omeprazole (PRILOSEC) 20 MG delayed-release capsule TAKE ONE CAPSULE BY MOUTH TWICE A  Cap 1   • lisinopril-hydrochlorothiazide (PRINZIDE, ZESTORETIC) 20-25 MG per tablet TAKE ONE TABLET BY MOUTH DAILY 90 Tab 1   • metoprolol (LOPRESSOR) 25 MG Tab TAKE ONE TABLET BY MOUTH TWICE A  Tab 1   • lisinopril (PRINIVIL) 20 MG Tab TAKE ONE TABLET BY MOUTH EVERY NIGHT AT BEDTIME 90 Tab 3   • DiphenhydrAMINE HCl, Sleep, (UNISOM SLEEPGELS PO) Take  by mouth.     • Calcium Carbonate Antacid (DILAN-SELTZER ANTACID PO) Take  by mouth.     • vitamin D (CHOLECALCIFEROL) 1000 UNIT Tab Take 1,000 Units by mouth every day.     • aspirin 81 MG tablet Take 4 Tabs by mouth every day. 100 Tab 3   • ondansetron (ZOFRAN) 4 MG Tab tablet Take 1 Tab by mouth every four hours as needed for Nausea/Vomiting. 30 Tab 0   • doxycycline (VIBRAMYCIN) 100 MG Tab Take 1 Tab by mouth 2 times a day. 20 Tab 0   • albuterol 108 (90 Base) MCG/ACT Aero Soln inhalation aerosol Inhale 2 Puffs by mouth every 6 hours as needed for Shortness of Breath. 8.5 g 0   • benzonatate (TESSALON) 200 MG capsule Take 1 Cap by mouth 3 times a day as needed for Cough. 60 Cap 0   • fluticasone (FLONASE) 50 MCG/ACT nasal spray Spray 2 Sprays in nose every day. 1 Bottle 1   • dicyclomine (BENTYL) 10 MG Cap Take 1 Cap by mouth 4 Times a Day,Before Meals and at Bedtime. 120 Cap 1   • hyoscyamine (ANASPAZ, LEVSIN) 0.125 MG tablet Take 1 Tab by mouth every four hours as needed for Cramping. 60 Tab 2   • Ferrous Sulfate (IRON) 325 (65 FE) MG Tab 1 tab daily 30 Tab 3    • raloxifene (EVISTA) 60 MG Tab Take 1 Tab by mouth every day. 90 Tab 5   • ondansetron (ZOFRAN ODT) 8 MG TABLET DISPERSIBLE DISSOLVE ONE TABLET BY MOUTH EVERY 8 HOURS AS NEEDED FOR NAUSEA AND VOMITING 30 Tab 3   • ketoconazole (NIZORAL) 2 % Cream Apply liberally to affected skin once a day 1 Tube 5   • metoclopramide (REGLAN) 5 MG tablet Take 1 Tab by mouth 3 times a day before meals. 120 Tab 2     No current facility-administered medications for this visit.          Social History:     Social History     Social History   • Marital status: Single     Spouse name: N/A   • Number of children: N/A   • Years of education: N/A     Occupational History   • Not on file.     Social History Main Topics   • Smoking status: Former Smoker   • Smokeless tobacco: Never Used   • Alcohol use 0.0 oz/week      Comment: rarely, 3 times per year   • Drug use: No   • Sexual activity: No     Other Topics Concern   • Not on file     Social History Narrative   • No narrative on file       Family History:    No family history on file.    Review of Systems:  All other review of systems are negative except what was mentioned above in the HPI.    Constitutional: Negative for fever, chills, weight loss and malaise/fatigue.    HEENT: No new auditory or visual complaints. No sore throat and neck pain.     Respiratory: Negative for cough, sputum production, shortness of breath and wheezing.    Cardiovascular: Negative for chest pain, palpitations, orthopnea and leg swelling.    Gastrointestinal: Negative for heartburn, nausea, vomiting and abdominal pain.    Genitourinary: Negative for dysuria, hematuria    Musculoskeletal: No new arthralgias or myalgias   Skin: Negative for rash and itching.    Neurological: Negative for focal weakness and headaches.    Endo/Heme/Allergies: No abnormal bleed/bruise.    Psychiatric/Behavioral: No new depression/anxiety.    Physical Exam:  Vitals: /88   Pulse 74   Temp 36.9 °C (98.5 °F)   Resp 16   Ht  1.524 m (5')   SpO2 98%   Breastfeeding? No     General: Not in acute distress, alert and oriented x 3  HEENT: No pallor, icterus. Oropharynx clear.   Neck: Supple, no palpable masses.  Lymph nodes: No palpable cervical, supraclavicular, axillary or inguinal lymphadenopathy.    CVS: regular rate and rhythm, no rubs or gallops  RESP: Clear to auscultate bilaterally, no wheezing or crackles.   ABD: Soft, non tender, non distended, positive bowel sounds, no palpable organomegaly  EXT: No edema or cyanosis  CNS: Alert and oriented x3, No focal deficits.  Skin- No rash      Labs:   No visits with results within 1 Week(s) from this visit.   Latest known visit with results is:   Hospital Outpatient Visit on 09/06/2017   Component Date Value Ref Range Status   • Cholesterol,Tot 09/06/2017 203* 100 - 199 mg/dL Final   • Triglycerides 09/06/2017 139  0 - 149 mg/dL Final   • HDL 09/06/2017 33* >=40 mg/dL Final   • LDL 09/06/2017 142* <100 mg/dL Final   • Ambiguous Date/Time 09/06/2017 See Below:   Final    Comment: This specimen was received from your office without a  collection date and/or time. Collection date and/or time  defaulted to receipt date and/or time.               Assessment and Plan:    1. Left breast atypical lobular hyperplasia and flat epithelial atypia: - these are markers for increased risk of subsequent breast carcinoma and not invasive cancerous lesions. Initially she was started on tamoxifen but was unable to tolerate it. She was then on Aromasin which had some side effects. -11/2016 on wards she has been on raloxifene  She has been on chemoprophylaxis on and off since her diagnosis. Plan is to treat her for at least 5 years of chemoprophylaxis until mid 2021. Reviewed recent mammogram which shows no evidence of malignancy. I will see her back in October 2018 with next mammogram results.  2.Hot flashes: He was unable to tolerate Effexor/gabapentin in the past. This is manageable according to her.  She  agreed and verbalized her agreement and understanding with the current plan.  I answered all questions and concerns she has at this time         Please note that this dictation was created using voice recognition software. I have made every reasonable attempt to correct obvious errors, but I expect that there are errors of grammar and possibly content that I did not discover before finalizing the note.      SIGNATURES:  Reagan Herzog    CC:  Ana Zuluaga P.A.-C.  No ref. provider found

## 2018-01-25 ENCOUNTER — TELEPHONE (OUTPATIENT)
Dept: HEMATOLOGY ONCOLOGY | Facility: MEDICAL CENTER | Age: 63
End: 2018-01-25

## 2018-01-25 NOTE — TELEPHONE ENCOUNTER
Patient returned my phone call this morning. I did discuss with Dr. Herzog Re: Raloxifene versus tamoxifen. Dr. Herzog would like the patient to be on raloxifene as prescribed. Patient stated she is concerned about the risk for elevation of cholesterol as she is unable to take statins. I did review her last lipid panel which shows improvement in her cholesterol from September 2016 to September 2017. Patient did state that she was taking the raloxifene at that time and she was more comfortable in getting back on the medication as planned. I did recommend with patient to continue to monitor cholesterol with her PCP. Patient appreciative of the phone call.

## 2018-03-07 ENCOUNTER — NON-PROVIDER VISIT (OUTPATIENT)
Dept: MEDICAL GROUP | Facility: CLINIC | Age: 63
End: 2018-03-07
Payer: COMMERCIAL

## 2018-03-07 ENCOUNTER — HOSPITAL ENCOUNTER (OUTPATIENT)
Facility: MEDICAL CENTER | Age: 63
End: 2018-03-07
Attending: PHYSICIAN ASSISTANT
Payer: COMMERCIAL

## 2018-03-07 DIAGNOSIS — Z01.89 ROUTINE LAB DRAW: ICD-10-CM

## 2018-03-07 PROCEDURE — 80061 LIPID PANEL: CPT

## 2018-03-07 PROCEDURE — 85027 COMPLETE CBC AUTOMATED: CPT

## 2018-03-07 PROCEDURE — 36415 COLL VENOUS BLD VENIPUNCTURE: CPT | Performed by: PHYSICIAN ASSISTANT

## 2018-03-07 PROCEDURE — 83540 ASSAY OF IRON: CPT

## 2018-03-07 PROCEDURE — 99000 SPECIMEN HANDLING OFFICE-LAB: CPT | Performed by: PHYSICIAN ASSISTANT

## 2018-03-07 PROCEDURE — 83550 IRON BINDING TEST: CPT

## 2018-03-07 PROCEDURE — 80053 COMPREHEN METABOLIC PANEL: CPT

## 2018-03-08 DIAGNOSIS — N60.99 ATYPICAL LOBULAR HYPERPLASIA OF BREAST: ICD-10-CM

## 2018-03-08 DIAGNOSIS — E66.9 OBESITY (BMI 30-39.9): ICD-10-CM

## 2018-03-08 DIAGNOSIS — K58.2 IRRITABLE BOWEL SYNDROME WITH BOTH CONSTIPATION AND DIARRHEA: ICD-10-CM

## 2018-03-08 LAB
ALBUMIN SERPL BCP-MCNC: 4.4 G/DL (ref 3.2–4.9)
ALBUMIN/GLOB SERPL: 1.3 G/DL
ALP SERPL-CCNC: 60 U/L (ref 30–99)
ALT SERPL-CCNC: 26 U/L (ref 2–50)
ANION GAP SERPL CALC-SCNC: 9 MMOL/L (ref 0–11.9)
AST SERPL-CCNC: 28 U/L (ref 12–45)
BILIRUB SERPL-MCNC: 0.6 MG/DL (ref 0.1–1.5)
BUN SERPL-MCNC: 14 MG/DL (ref 8–22)
CALCIUM SERPL-MCNC: 10.3 MG/DL (ref 8.5–10.5)
CHLORIDE SERPL-SCNC: 101 MMOL/L (ref 96–112)
CHOLEST SERPL-MCNC: 212 MG/DL (ref 100–199)
CO2 SERPL-SCNC: 28 MMOL/L (ref 20–33)
CREAT SERPL-MCNC: 0.72 MG/DL (ref 0.5–1.4)
ERYTHROCYTE [DISTWIDTH] IN BLOOD BY AUTOMATED COUNT: 47 FL (ref 35.9–50)
GLOBULIN SER CALC-MCNC: 3.5 G/DL (ref 1.9–3.5)
GLUCOSE SERPL-MCNC: 99 MG/DL (ref 65–99)
HCT VFR BLD AUTO: 35.7 % (ref 37–47)
HDLC SERPL-MCNC: 40 MG/DL
HGB BLD-MCNC: 11 G/DL (ref 12–16)
IRON SATN MFR SERPL: 11 % (ref 15–55)
IRON SERPL-MCNC: 52 UG/DL (ref 40–170)
LDLC SERPL CALC-MCNC: 138 MG/DL
MCH RBC QN AUTO: 25 PG (ref 27–33)
MCHC RBC AUTO-ENTMCNC: 30.8 G/DL (ref 33.6–35)
MCV RBC AUTO: 81.1 FL (ref 81.4–97.8)
PLATELET # BLD AUTO: 124 K/UL (ref 164–446)
PMV BLD AUTO: 11.5 FL (ref 9–12.9)
POTASSIUM SERPL-SCNC: 4.3 MMOL/L (ref 3.6–5.5)
PROT SERPL-MCNC: 7.9 G/DL (ref 6–8.2)
RBC # BLD AUTO: 4.4 M/UL (ref 4.2–5.4)
SODIUM SERPL-SCNC: 138 MMOL/L (ref 135–145)
TIBC SERPL-MCNC: 456 UG/DL (ref 250–450)
TRIGL SERPL-MCNC: 171 MG/DL (ref 0–149)
WBC # BLD AUTO: 4.1 K/UL (ref 4.8–10.8)

## 2018-03-13 ENCOUNTER — OFFICE VISIT (OUTPATIENT)
Dept: MEDICAL GROUP | Facility: CLINIC | Age: 63
End: 2018-03-13
Payer: COMMERCIAL

## 2018-03-13 VITALS
DIASTOLIC BLOOD PRESSURE: 82 MMHG | HEART RATE: 79 BPM | HEIGHT: 60 IN | OXYGEN SATURATION: 96 % | SYSTOLIC BLOOD PRESSURE: 132 MMHG | TEMPERATURE: 98 F

## 2018-03-13 DIAGNOSIS — R11.0 NAUSEA: ICD-10-CM

## 2018-03-13 DIAGNOSIS — B02.9 HERPES ZOSTER WITHOUT COMPLICATION: ICD-10-CM

## 2018-03-13 DIAGNOSIS — E78.5 HYPERLIPIDEMIA, UNSPECIFIED HYPERLIPIDEMIA TYPE: ICD-10-CM

## 2018-03-13 DIAGNOSIS — K58.0 IRRITABLE BOWEL SYNDROME WITH DIARRHEA: ICD-10-CM

## 2018-03-13 DIAGNOSIS — N60.99 ATYPICAL LOBULAR HYPERPLASIA OF BREAST: ICD-10-CM

## 2018-03-13 DIAGNOSIS — D50.9 IRON DEFICIENCY ANEMIA, UNSPECIFIED IRON DEFICIENCY ANEMIA TYPE: ICD-10-CM

## 2018-03-13 PROBLEM — B36.9 FUNGAL DERMATITIS: Status: RESOLVED | Noted: 2017-05-11 | Resolved: 2018-03-13

## 2018-03-13 PROBLEM — Z76.89 ENCOUNTER TO ESTABLISH CARE: Status: RESOLVED | Noted: 2017-09-12 | Resolved: 2018-03-13

## 2018-03-13 PROCEDURE — 99214 OFFICE O/P EST MOD 30 MIN: CPT | Performed by: PHYSICIAN ASSISTANT

## 2018-03-13 RX ORDER — ONDANSETRON 4 MG/1
8 TABLET, FILM COATED ORAL EVERY 4 HOURS PRN
Qty: 60 TAB | Refills: 3 | Status: SHIPPED | OUTPATIENT
Start: 2018-03-13 | End: 2018-06-01

## 2018-03-13 NOTE — ASSESSMENT & PLAN NOTE
Patient is not currently taking iron supplements as they constipated her too much and caused a hemorrhoid. Her percent saturation is 11% at this time. She does feel lethargic and believes her iron deficiency is due to malabsorption due to persistent diarrhea from IBS.

## 2018-03-13 NOTE — ASSESSMENT & PLAN NOTE
Patient continues follow-up with oncology. She says states that she has lost weight recently however, she declines to be weighed in our clinic. She continues to take raloxifene.

## 2018-03-13 NOTE — ASSESSMENT & PLAN NOTE
This patient feeling nauseated due to her breast cancer treatment. She states she takes 8 mg ondansetron as needed for this problem. She states this dose and medication works very well for her and does not cause excessive constipation. She is requesting medication refills today.

## 2018-03-13 NOTE — ASSESSMENT & PLAN NOTE
Component Value Ref Range & Units Status   Cholesterol,Tot 212   100 - 199 mg/dL Final   Triglycerides 171   0 - 149 mg/dL Final   HDL 40  >=40 mg/dL Final      <100 mg/dL Final     Patient's most recent lab results are as above. The patient says she does consume a lot of bread and pasta. Her HDL is up from 33 and LDL is down from 142 in September 2017. She does not take statin medications but is currently taking garlic on a daily basis to control this.

## 2018-03-13 NOTE — PROGRESS NOTES
Chief Complaint   Patient presents with   • Hyperlipidemia       HISTORY OF PRESENT ILLNESS: Patient is a 62 y.o. female established patient who presents today for evaluation and management of:    Hyperlipidemia  Component Value Ref Range & Units Status   Cholesterol,Tot 212   100 - 199 mg/dL Final   Triglycerides 171   0 - 149 mg/dL Final   HDL 40  >=40 mg/dL Final      <100 mg/dL Final     Patient's most recent lab results are as above. The patient says she does consume a lot of bread and pasta. Her HDL is up from 33 and LDL is down from 142 in September 2017. She does not take statin medications but is currently taking garlic on a daily basis to control this.    Nausea  This patient feeling nauseated due to her breast cancer treatment. She states she takes 8 mg ondansetron as needed for this problem. She states this dose and medication works very well for her and does not cause excessive constipation. She is requesting medication refills today.    Atypical lobular hyperplasia of breast  Patient continues follow-up with oncology. She says states that she has lost weight recently however, she declines to be weighed in our clinic. She continues to take raloxifene.    Iron deficiency anemia  Patient is not currently taking iron supplements as they constipated her too much and caused a hemorrhoid. Her percent saturation is 11% at this time. She does feel lethargic and believes her iron deficiency is due to malabsorption due to persistent diarrhea from IBS.     Herpes zoster without complication  After getting a shingles vaccine subcutaneous rather than intramuscular, the patient developed a severe rash in the local area where the vaccine was injected and then developed a shingles rash on her left axillary region and a small area on her right axillary region that follows a dermatomal pattern.    IBS (irritable bowel syndrome)  This is a chronic problem. She denies any problems or concerns at this time.        Patient Active Problem List    Diagnosis Date Noted   • Herpes zoster without complication 03/13/2018   • Iron deficiency anemia 03/13/2018   • Nausea 03/13/2018   • Seasonal allergic rhinitis 09/12/2017   • Obesity (BMI 30-39.9) 09/12/2017   • Atypical lobular hyperplasia of breast 01/11/2017   • Lobular hyperplasia, atypical, breast 12/21/2016   • Depression 05/19/2016   • Abnormal mammogram 03/16/2016   • Skin lesion 02/18/2016   • Gastroesophageal reflux disease without esophagitis 10/29/2015   • IBS (irritable bowel syndrome) 10/29/2015   • Hiatal hernia 10/29/2015   • Hyperlipidemia 10/29/2015   • HTN (hypertension) 10/26/2010       Allergies:Azithromycin; Ciprofloxacin hcl; Clindamycin; Gabapentin; Lyrica; Pcn [penicillins]; Valtrex [valacyclovir hcl]; and Vicodin [hydrocodone-acetaminophen]    Current Outpatient Prescriptions   Medication Sig Dispense Refill   • ondansetron (ZOFRAN) 4 MG Tab tablet Take 2 Tabs by mouth every four hours as needed for Nausea/Vomiting. 60 Tab 3   • omeprazole (PRILOSEC) 20 MG delayed-release capsule TAKE ONE CAPSULE BY MOUTH TWICE A  Cap 1   • lisinopril-hydrochlorothiazide (PRINZIDE, ZESTORETIC) 20-25 MG per tablet TAKE ONE TABLET BY MOUTH DAILY 90 Tab 1   • benzonatate (TESSALON) 200 MG capsule Take 1 Cap by mouth 3 times a day as needed for Cough. 60 Cap 0   • metoprolol (LOPRESSOR) 25 MG Tab TAKE ONE TABLET BY MOUTH TWICE A  Tab 1   • Ferrous Sulfate (IRON) 325 (65 FE) MG Tab 1 tab daily 30 Tab 3   • raloxifene (EVISTA) 60 MG Tab Take 1 Tab by mouth every day. 90 Tab 5   • lisinopril (PRINIVIL) 20 MG Tab TAKE ONE TABLET BY MOUTH EVERY NIGHT AT BEDTIME 90 Tab 3   • DiphenhydrAMINE HCl, Sleep, (UNISOM SLEEPGELS PO) Take  by mouth.     • vitamin D (CHOLECALCIFEROL) 1000 UNIT Tab Take 1,000 Units by mouth every day.     • aspirin 81 MG tablet Take 4 Tabs by mouth every day. 100 Tab 3   • albuterol 108 (90 Base) MCG/ACT Aero Soln inhalation aerosol Inhale 2  Puffs by mouth every 6 hours as needed for Shortness of Breath. 8.5 g 0   • fluticasone (FLONASE) 50 MCG/ACT nasal spray Spray 2 Sprays in nose every day. 1 Bottle 1   • dicyclomine (BENTYL) 10 MG Cap Take 1 Cap by mouth 4 Times a Day,Before Meals and at Bedtime. 120 Cap 1   • hyoscyamine (ANASPAZ, LEVSIN) 0.125 MG tablet Take 1 Tab by mouth every four hours as needed for Cramping. 60 Tab 2   • ketoconazole (NIZORAL) 2 % Cream Apply liberally to affected skin once a day 1 Tube 5   • metoclopramide (REGLAN) 5 MG tablet Take 1 Tab by mouth 3 times a day before meals. 120 Tab 2     No current facility-administered medications for this visit.        Social History   Substance Use Topics   • Smoking status: Former Smoker   • Smokeless tobacco: Never Used   • Alcohol use 0.0 oz/week      Comment: rarely, 3 times per year       Family Status   Relation Status   • Mother    • Father    • Sister Alive   • Brother Alive   History reviewed. No pertinent family history.    Review of Systems:   Constitutional: Positive for recent unintentional weight loss- will address with her oncologist. Negative for fever, chills and malaise/fatigue.   HENT: Negative for ear pain, nosebleeds, congestion, sore throat and neck pain.    Eyes: Negative for blurred vision.   Respiratory: Negative for cough, sputum production, shortness of breath and wheezing.    Cardiovascular: Negative for chest pain, palpitations, orthopnea and leg swelling.   Gastrointestinal: Positive for nausea and occasional vomiting. Negative for heartburn and abdominal pain.   Neurological: Negative for dizziness and headaches.   Endo/Heme/Allergies: Does not bruise/bleed easily.   Psychiatric/Behavioral: Negative for depression, suicidal ideas and memory loss.  The patient is not nervous/anxious and does not have insomnia.      Exam:  Blood pressure 132/82, pulse 79, temperature 36.7 °C (98 °F), height 1.524 m (5'), SpO2 96 %.  There is no height or weight  on file to calculate BMI.  General:  Obese female in NAD  Head: is grossly normal.  Neck: Supple without masses. Thyroid is not visibly enlarged.  Pulmonary: Clear to ausculation. Normal effort. No rales, ronchi, or wheezing.  Cardiovascular: Regular rate and rhythm without murmur. Carotid and radial pulses are intact and equal bilaterally.  Extremities: no clubbing, cyanosis, or edema.    Medical decision-making and discussion:  1. Atypical lobular hyperplasia of breast  Patient was advised to continue follow-up with her oncologist especially in light of her recent weight loss.  - ondansetron (ZOFRAN) 4 MG Tab tablet; Take 2 Tabs by mouth every four hours as needed for Nausea/Vomiting.  Dispense: 60 Tab; Refill: 3    2. Nausea  Patient was advised the following medication can cause constipation especially accommodation with iron supplements and to be careful to prevent this with extra water, vitamin C and fiber.  - ondansetron (ZOFRAN) 4 MG Tab tablet; Take 2 Tabs by mouth every four hours as needed for Nausea/Vomiting.  Dispense: 60 Tab; Refill: 3    3. Herpes zoster without complication  Patient is allergic to valacyclovir and acyclovir so symptomatically treatment at this time.    4. Hyperlipidemia, unspecified hyperlipidemia type  Patient was advised to reduce the carbohydrates in her daily diet including breads and pastas that she does consume a lot of this. She was also advised to continue taking her daily garlic supplement as this in combination with increased exercise has increased her HDL levels.    5. Iron deficiency anemia, unspecified iron deficiency anemia type  Patient was advised to start taking the ferrous sulfate 325 mg tablets that had previously been prescribed in addition to an acidic food such as oranges or tomatoes with fiber at the same time in order to increase absorption and decrease the likelihood of constipation..    6. Irritable bowel syndrome with diarrhea  Controlled with diet at this  time.      Please note that this dictation was created using voice recognition software. I have made every reasonable attempt to correct obvious errors, but I expect that there are errors of grammar and possibly content that I did not discover before finalizing the note.      No Follow-up on file.

## 2018-03-13 NOTE — ASSESSMENT & PLAN NOTE
After getting a shingles vaccine subcutaneous rather than intramuscular, the patient developed a severe rash in the local area where the vaccine was injected and then developed a shingles rash on her left axillary region and a small area on her right axillary region that follows a dermatomal pattern.

## 2018-03-14 DIAGNOSIS — J22 LOWER RESP. TRACT INFECTION: ICD-10-CM

## 2018-03-14 RX ORDER — BENZONATATE 200 MG/1
200 CAPSULE ORAL 3 TIMES DAILY PRN
Qty: 60 CAP | Refills: 0 | OUTPATIENT
Start: 2018-03-14

## 2018-03-14 NOTE — TELEPHONE ENCOUNTER
Pt states she starting getting a cough last night, she states he has been sick with the flu this last week. She states the cough is causing her to have a headache. Please advise.    Was the patient seen in the last year in this department? Yes     Does patient have an active prescription for medications requested? Yes     Received Request Via: Patient

## 2018-03-14 NOTE — TELEPHONE ENCOUNTER
im sorry but since the patient didn't mention cough at our recent visit I don't want to prescribe a medication to hide a possible larger problem.

## 2018-03-15 ENCOUNTER — TELEPHONE (OUTPATIENT)
Dept: MEDICAL GROUP | Facility: CLINIC | Age: 63
End: 2018-03-15

## 2018-03-15 DIAGNOSIS — I10 ESSENTIAL HYPERTENSION: ICD-10-CM

## 2018-03-16 DIAGNOSIS — R05.8 DRY COUGH: ICD-10-CM

## 2018-03-16 RX ORDER — BENZONATATE 100 MG/1
100 CAPSULE ORAL 3 TIMES DAILY PRN
Qty: 60 CAP | Refills: 0 | Status: SHIPPED | OUTPATIENT
Start: 2018-03-16 | End: 2018-06-01

## 2018-04-24 ENCOUNTER — APPOINTMENT (OUTPATIENT)
Dept: RADIOLOGY | Facility: MEDICAL CENTER | Age: 63
End: 2018-04-24
Attending: EMERGENCY MEDICINE
Payer: COMMERCIAL

## 2018-04-24 ENCOUNTER — HOSPITAL ENCOUNTER (EMERGENCY)
Facility: MEDICAL CENTER | Age: 63
End: 2018-04-24
Attending: EMERGENCY MEDICINE
Payer: COMMERCIAL

## 2018-04-24 VITALS
HEIGHT: 60 IN | OXYGEN SATURATION: 98 % | HEART RATE: 75 BPM | BODY MASS INDEX: 39.34 KG/M2 | RESPIRATION RATE: 17 BRPM | TEMPERATURE: 98.2 F | WEIGHT: 200.4 LBS

## 2018-04-24 DIAGNOSIS — R06.00 DYSPNEA, UNSPECIFIED TYPE: ICD-10-CM

## 2018-04-24 DIAGNOSIS — R06.02 SOB (SHORTNESS OF BREATH): ICD-10-CM

## 2018-04-24 LAB
ALBUMIN SERPL BCP-MCNC: 4.1 G/DL (ref 3.2–4.9)
ALBUMIN/GLOB SERPL: 1.1 G/DL
ALP SERPL-CCNC: 54 U/L (ref 30–99)
ALT SERPL-CCNC: 33 U/L (ref 2–50)
ANION GAP SERPL CALC-SCNC: 6 MMOL/L (ref 0–11.9)
AST SERPL-CCNC: 37 U/L (ref 12–45)
BASOPHILS # BLD AUTO: 0.4 % (ref 0–1.8)
BASOPHILS # BLD: 0.02 K/UL (ref 0–0.12)
BILIRUB SERPL-MCNC: 0.8 MG/DL (ref 0.1–1.5)
BNP SERPL-MCNC: 150 PG/ML (ref 0–100)
BUN SERPL-MCNC: 11 MG/DL (ref 8–22)
CALCIUM SERPL-MCNC: 9.6 MG/DL (ref 8.4–10.2)
CHLORIDE SERPL-SCNC: 104 MMOL/L (ref 96–112)
CO2 SERPL-SCNC: 26 MMOL/L (ref 20–33)
CREAT SERPL-MCNC: 0.62 MG/DL (ref 0.5–1.4)
EOSINOPHIL # BLD AUTO: 0.08 K/UL (ref 0–0.51)
EOSINOPHIL NFR BLD: 1.5 % (ref 0–6.9)
ERYTHROCYTE [DISTWIDTH] IN BLOOD BY AUTOMATED COUNT: 45.1 FL (ref 35.9–50)
GLOBULIN SER CALC-MCNC: 3.8 G/DL (ref 1.9–3.5)
GLUCOSE SERPL-MCNC: 87 MG/DL (ref 65–99)
HCT VFR BLD AUTO: 35 % (ref 37–47)
HGB BLD-MCNC: 11 G/DL (ref 12–16)
IMM GRANULOCYTES # BLD AUTO: 0.01 K/UL (ref 0–0.11)
IMM GRANULOCYTES NFR BLD AUTO: 0.2 % (ref 0–0.9)
LYMPHOCYTES # BLD AUTO: 1.34 K/UL (ref 1–4.8)
LYMPHOCYTES NFR BLD: 24.5 % (ref 22–41)
MCH RBC QN AUTO: 24.8 PG (ref 27–33)
MCHC RBC AUTO-ENTMCNC: 31.4 G/DL (ref 33.6–35)
MCV RBC AUTO: 79 FL (ref 81.4–97.8)
MONOCYTES # BLD AUTO: 0.37 K/UL (ref 0–0.85)
MONOCYTES NFR BLD AUTO: 6.8 % (ref 0–13.4)
NEUTROPHILS # BLD AUTO: 3.64 K/UL (ref 2–7.15)
NEUTROPHILS NFR BLD: 66.6 % (ref 44–72)
NRBC # BLD AUTO: 0 K/UL
NRBC BLD-RTO: 0 /100 WBC
PLATELET # BLD AUTO: 125 K/UL (ref 164–446)
PMV BLD AUTO: 9.6 FL (ref 9–12.9)
POTASSIUM SERPL-SCNC: 3.8 MMOL/L (ref 3.6–5.5)
PROT SERPL-MCNC: 7.9 G/DL (ref 6–8.2)
RBC # BLD AUTO: 4.43 M/UL (ref 4.2–5.4)
SODIUM SERPL-SCNC: 136 MMOL/L (ref 135–145)
T4 FREE SERPL-MCNC: 0.82 NG/DL (ref 0.58–1.64)
TROPONIN I SERPL-MCNC: <0.02 NG/ML (ref 0–0.04)
WBC # BLD AUTO: 5.5 K/UL (ref 4.8–10.8)

## 2018-04-24 PROCEDURE — 36415 COLL VENOUS BLD VENIPUNCTURE: CPT

## 2018-04-24 PROCEDURE — 80053 COMPREHEN METABOLIC PANEL: CPT

## 2018-04-24 PROCEDURE — 93005 ELECTROCARDIOGRAM TRACING: CPT | Performed by: EMERGENCY MEDICINE

## 2018-04-24 PROCEDURE — 84439 ASSAY OF FREE THYROXINE: CPT

## 2018-04-24 PROCEDURE — 99284 EMERGENCY DEPT VISIT MOD MDM: CPT

## 2018-04-24 PROCEDURE — 85025 COMPLETE CBC W/AUTO DIFF WBC: CPT

## 2018-04-24 PROCEDURE — 71046 X-RAY EXAM CHEST 2 VIEWS: CPT

## 2018-04-24 PROCEDURE — 84484 ASSAY OF TROPONIN QUANT: CPT

## 2018-04-24 PROCEDURE — 83880 ASSAY OF NATRIURETIC PEPTIDE: CPT

## 2018-04-24 PROCEDURE — 71275 CT ANGIOGRAPHY CHEST: CPT

## 2018-04-24 PROCEDURE — 700117 HCHG RX CONTRAST REV CODE 255: Performed by: EMERGENCY MEDICINE

## 2018-04-24 RX ADMIN — IOHEXOL 75 ML: 350 INJECTION, SOLUTION INTRAVENOUS at 13:48

## 2018-04-24 ASSESSMENT — PAIN SCALES - GENERAL: PAINLEVEL_OUTOF10: 0

## 2018-04-24 NOTE — ED NOTES
Pt to er with c/o sob for 1 week as well as hx of palpitations last week though none today. ekg in process, no stemi. Pt in no apparent dsitress

## 2018-04-24 NOTE — ED NOTES
Pt reports SOB has resolved, reports slight sternal pain with a single deep inspiration, has not occurred again. No needs or complaints, VS stable,

## 2018-04-24 NOTE — DISCHARGE INSTRUCTIONS
Shortness of Breath, Adult  Shortness of breath is when a person has trouble breathing enough air, or when a person feels like she or he is having trouble breathing in enough air. Shortness of breath could be a sign of medical problem.  Follow these instructions at home:  Pay attention to any changes in your symptoms. Take these actions to help with your condition:  · Do not smoke. Smoking is a common cause of shortness of breath. If you smoke and you need help quitting, ask your health care provider.  · Avoid things that can irritate your airways, such as:  ¨ Mold.  ¨ Dust.  ¨ Air pollution.  ¨ Chemical fumes.  ¨ Things that can cause allergy symptoms (allergens), if you have allergies.  · Keep your living space clean and free of mold and dust.  · Rest as needed. Slowly return to your usual activities.  · Take over-the-counter and prescription medicines, including oxygen and inhaled medicines, only as told by your health care provider.  · Keep all follow-up visits as told by your health care provider. This is important.  Contact a health care provider if:  · Your condition does not improve as soon as expected.  · You have a hard time doing your normal activities, even after you rest.  · You have new symptoms.  Get help right away if:  · Your shortness of breath gets worse.  · You have shortness of breath when you are resting.  · You feel light-headed or you faint.  · You have a cough that is not controlled with medicines.  · You cough up blood.  · You have pain with breathing.  · You have pain in your chest, arms, shoulders, or abdomen.  · You have a fever.  · You cannot walk up stairs or exercise the way that you normally do.  This information is not intended to replace advice given to you by your health care provider. Make sure you discuss any questions you have with your health care provider.  Document Released: 09/12/2002 Document Revised: 07/08/2017 Document Reviewed: 05/25/2017  [employer] Patient  Education © 2017 [employer]

## 2018-04-24 NOTE — ED PROVIDER NOTES
ED Provider Note    CHIEF COMPLAINT  Chief Complaint   Patient presents with   • Shortness of Breath     for weeks   • Palpitations     last week       HPI  Yesenia Huff is a 63 y.o. female who presents for evaluation of shortness of breath for a week in them and palpitations occasional shortness of breath with exertion. She has no pain with a deep breath she has no resting or exertional chest pain. Denies orthopnea. She has a history of previous anemia. Exertion makes her shortness of breath worse. She is not oxygen dependent and does not have a history of sleep apnea. She is a former smoker.    REVIEW OF SYSTEMS  See HPI for further details. She denies any headache blurred vision or sore throats lymph node swelling abdominal pain diarrhea she has a history of reflux. All other systems are reviewed and negative except as noted above    PAST MEDICAL HISTORY  Past Medical History:   Diagnosis Date   • Abnormal mammogram 3/16/2016   • GERD (gastroesophageal reflux disease)    • Hiatal hernia 10/29/2015   • HTN (hypertension)    • IBS (irritable bowel syndrome)    • Insomnia    • Pneumonia    • Trigger finger of right hand 3/16/2016   • Vitamin D deficiency 11/18/2015       FAMILY HISTORY  No family history on file.    SOCIAL HISTORY  Social History     Social History   • Marital status: Single     Spouse name: N/A   • Number of children: N/A   • Years of education: N/A     Social History Main Topics   • Smoking status: Former Smoker   • Smokeless tobacco: Never Used   • Alcohol use 0.0 oz/week      Comment: rarely, 3 times per year   • Drug use: No   • Sexual activity: No     Other Topics Concern   • Not on file     Social History Narrative   • No narrative on file       SURGICAL HISTORY  No past surgical history on file.    CURRENT MEDICATIONS  Home Medications    **Home medications have not yet been reviewed for this encounter**          ALLERGIES  Allergies   Allergen Reactions   • Azithromycin      Heart racing    • Ciprofloxacin Hcl    • Clindamycin    • Gabapentin Itching     Itching on back of head.   • Lyrica    • Pcn [Penicillins]    • Valtrex [Valacyclovir Hcl]    • Vicodin [Hydrocodone-Acetaminophen]        PHYSICAL EXAM  VITAL SIGNS: Pulse 73   Temp 36.8 °C (98.2 °F)   Resp (!) 33   Ht 1.524 m (5')   Wt 90.9 kg (200 lb 6.4 oz)   SpO2 96%   BMI 39.14 kg/m²    Constitutional :  Well developed, Well nourished, No acute distress, Non-toxic appearance.   HENT: Head is atraumatic normocephalic moist mucous membranes  Eyes: Normal-appearing nonicteric  Neck: Normal range of motion, No tenderness, Supple, No stridor.   Lymphatic: No cervical adenopathy.   Cardiovascular: Normal heart rate, Normal rhythm, No murmurs, No rubs, No gallops.   Thorax & Lungs: Equal breath sounds bilaterally no rales rhonchi  Skin: Warm, Dry, No erythema, No rash.   Abdomen is soft no distention rebound guarding or tenderness  Extremities minimal edema  Neurologically she is awake she is alert she has no focal neurological findings    CT-CTA CHEST PULMONARY ARTERY W/ RECONS   Final Result      1.  No evidence of pulmonary embolus.      2.  Fatty liver.      3.  Splenomegaly.      DX-CHEST-2 VIEWS   Final Result      1.  Minimally prominent interstitium, nonspecific.   2.  No pneumonia or overt pulmonary edema.        Results for orders placed or performed during the hospital encounter of 04/24/18   CBC w/ Differential   Result Value Ref Range    WBC 5.5 4.8 - 10.8 K/uL    RBC 4.43 4.20 - 5.40 M/uL    Hemoglobin 11.0 (L) 12.0 - 16.0 g/dL    Hematocrit 35.0 (L) 37.0 - 47.0 %    MCV 79.0 (L) 81.4 - 97.8 fL    MCH 24.8 (L) 27.0 - 33.0 pg    MCHC 31.4 (L) 33.6 - 35.0 g/dL    RDW 45.1 35.9 - 50.0 fL    Platelet Count 125 (L) 164 - 446 K/uL    MPV 9.6 9.0 - 12.9 fL    Neutrophils-Polys 66.60 44.00 - 72.00 %    Lymphocytes 24.50 22.00 - 41.00 %    Monocytes 6.80 0.00 - 13.40 %    Eosinophils 1.50 0.00 - 6.90 %    Basophils 0.40 0.00 - 1.80 %     Immature Granulocytes 0.20 0.00 - 0.90 %    Nucleated RBC 0.00 /100 WBC    Neutrophils (Absolute) 3.64 2.00 - 7.15 K/uL    Lymphs (Absolute) 1.34 1.00 - 4.80 K/uL    Monos (Absolute) 0.37 0.00 - 0.85 K/uL    Eos (Absolute) 0.08 0.00 - 0.51 K/uL    Baso (Absolute) 0.02 0.00 - 0.12 K/uL    Immature Granulocytes (abs) 0.01 0.00 - 0.11 K/uL    NRBC (Absolute) 0.00 K/uL   Complete Metabolic Panel (CMP)   Result Value Ref Range    Sodium 136 135 - 145 mmol/L    Potassium 3.8 3.6 - 5.5 mmol/L    Chloride 104 96 - 112 mmol/L    Co2 26 20 - 33 mmol/L    Anion Gap 6.0 0.0 - 11.9    Glucose 87 65 - 99 mg/dL    Bun 11 8 - 22 mg/dL    Creatinine 0.62 0.50 - 1.40 mg/dL    Calcium 9.6 8.4 - 10.2 mg/dL    AST(SGOT) 37 12 - 45 U/L    ALT(SGPT) 33 2 - 50 U/L    Alkaline Phosphatase 54 30 - 99 U/L    Total Bilirubin 0.8 0.1 - 1.5 mg/dL    Albumin 4.1 3.2 - 4.9 g/dL    Total Protein 7.9 6.0 - 8.2 g/dL    Globulin 3.8 (H) 1.9 - 3.5 g/dL    A-G Ratio 1.1 g/dL   Btype Natriuretic Peptide   Result Value Ref Range    B Natriuretic Peptide 150 (H) 0 - 100 pg/mL   Troponin STAT   Result Value Ref Range    Troponin I <0.02 0.00 - 0.04 ng/mL   Free Thyroxine (add to TSH for patients with existing thyroid dysfunction)   Result Value Ref Range    Free T-4 0.82 0.58 - 1.64 ng/dL   ESTIMATED GFR   Result Value Ref Range    GFR If African American >60 >60 mL/min/1.73 m 2    GFR If Non African American >60 >60 mL/min/1.73 m 2   EKG (ER)   Result Value Ref Range    Report       [facility] Emergency Dept.    Test Date:  2018  Pt Name:    JUAN CARLOS RIGGINS               Department: [facility]  MRN:        [id number]                      [facility]  Gender:     Female                       Technician: DENISE  :        [id number]                   Requested By:ABIMAEL MOBLEY  Order #:    [id number]                    Reading MD:    Measurements  Intervals                                Axis  Rate:       76                            P:          42  DC:         140                          QRS:        -18  QRSD:       86                           T:          21  QT:         400  QTc:        450    Interpretive Statements  SINUS RHYTHM  BORDERLINE LEFT AXIS DEVIATION  Compared to ECG 03/28/2014 02:04:15  No significant changes       EKG Interpretation    Interpreted by me    Rhythm: normal sinus   Rate: normal  Axis: normal  Ectopy: none  Conduction: normal  ST Segments: no acute change  T Waves: no acute change  Q Waves: none    Clinical Impression: no acute changes and normal EKG with borderline left axis deviation          COURSE & MEDICAL DECISION MAKING  Pertinent Labs & Imaging studies reviewed. (See chart for details)  Patient presenting for evaluation of intermittent dyspnea. The patient has no cough no findings consistent with pneumonia. She is taking estrogen blocker putting her at risk for pulmonary embolus and CT imaging was obtained to rule out PE because of her symptoms. This is a negative study. The patient has no evidence of hypoxemia, no history or EKG changes consistent with cardiac ischemia. She does have a long smoking history may have some mild underlying COPD or emphysema causing intermittent shortness of breath. Initially I suspect she has sleep apnea. She has never had a sleep study. She does have a very minimally elevated BNP but no overt findings of heart failure. She did not take her diuretic for 2 days and she may have a mild fluid retention. At this time she is stable for discharge with close clinical follow-up. I have scheduled an echocardiogram to evaluate heart function on Friday. She then will follow up with her primary care physician to review her laboratory tests. She is also scheduled for cardiology in June or possibly earlier given results of her echo. I discussed she may need a sleep study and advised her to see her primary care physician for initiation of the study. The patient has no evidence of  hypoxemia after exertion. I had her walk around the emergency department quite vigorously and she is not become hypoxic she is stable for discharge        FINAL IMPRESSION  1. Dyspnea etiology unclear  2.   3.      Electronically signed by: Bharat Samaniego, 4/24/2018

## 2018-04-24 NOTE — ED NOTES
Pt sitting up in [facility] visiting with child, denies any changes in symptoms at this time. WIll continue to monitor.

## 2018-04-26 DIAGNOSIS — J44.9 CHRONIC OBSTRUCTIVE PULMONARY DISEASE, UNSPECIFIED COPD TYPE (HCC): ICD-10-CM

## 2018-04-26 DIAGNOSIS — G47.00 FREQUENT NOCTURNAL AWAKENING: ICD-10-CM

## 2018-05-04 ENCOUNTER — HOSPITAL ENCOUNTER (OUTPATIENT)
Dept: CARDIOLOGY | Facility: MEDICAL CENTER | Age: 63
End: 2018-05-04
Attending: EMERGENCY MEDICINE
Payer: COMMERCIAL

## 2018-05-04 DIAGNOSIS — R06.02 SOB (SHORTNESS OF BREATH): ICD-10-CM

## 2018-05-04 LAB
LV EJECT FRACT  99904: 70
LV EJECT FRACT MOD 2C 99903: 73.99
LV EJECT FRACT MOD 4C 99902: 78.04
LV EJECT FRACT MOD BP 99901: 77.54

## 2018-05-04 PROCEDURE — 93306 TTE W/DOPPLER COMPLETE: CPT

## 2018-05-06 LAB — EKG IMPRESSION: NORMAL

## 2018-05-10 ENCOUNTER — OFFICE VISIT (OUTPATIENT)
Dept: MEDICAL GROUP | Facility: CLINIC | Age: 63
End: 2018-05-10
Payer: COMMERCIAL

## 2018-05-10 VITALS
HEIGHT: 60 IN | WEIGHT: 201 LBS | BODY MASS INDEX: 39.46 KG/M2 | DIASTOLIC BLOOD PRESSURE: 80 MMHG | SYSTOLIC BLOOD PRESSURE: 118 MMHG | HEART RATE: 67 BPM | RESPIRATION RATE: 14 BRPM | OXYGEN SATURATION: 96 % | TEMPERATURE: 98.6 F

## 2018-05-10 DIAGNOSIS — Z09 ENCOUNTER FOR EXAMINATION FOLLOWING TREATMENT AT HOSPITAL: ICD-10-CM

## 2018-05-10 DIAGNOSIS — M54.32 SCIATIC NERVE PAIN, LEFT: ICD-10-CM

## 2018-05-10 DIAGNOSIS — G47.00 INSOMNIA, UNSPECIFIED TYPE: ICD-10-CM

## 2018-05-10 DIAGNOSIS — R16.1 SPLENOMEGALY: ICD-10-CM

## 2018-05-10 DIAGNOSIS — D50.9 IRON DEFICIENCY ANEMIA, UNSPECIFIED IRON DEFICIENCY ANEMIA TYPE: ICD-10-CM

## 2018-05-10 PROCEDURE — 99213 OFFICE O/P EST LOW 20 MIN: CPT | Performed by: PHYSICIAN ASSISTANT

## 2018-05-10 ASSESSMENT — PATIENT HEALTH QUESTIONNAIRE - PHQ9
CLINICAL INTERPRETATION OF PHQ2 SCORE: 3
SUM OF ALL RESPONSES TO PHQ QUESTIONS 1-9: 16
5. POOR APPETITE OR OVEREATING: 3 - NEARLY EVERY DAY

## 2018-05-10 NOTE — PROGRESS NOTES
Chief Complaint   Patient presents with   • Hospital Follow-up     Shortness of breath        HISTORY OF PRESENT ILLNESS: Patient is a 63 y.o. female established patient who presents today for evaluation and management of:    Splenomegaly  Recently seen on CT abdomen, this patient is not experiencing left sided pain. She had a long history of iron deficiency anemia and continues breast cancer treatment.    Iron deficiency anemia  Patient is currently taking iron supplements every other or every third day as they constipated her too much and caused a hemorrhoid when she took them every day in the past. Her iron percent saturation is 11% at this time. She does feel lethargic and somewhat short of breath and believes her iron deficiency is due to malabsorption due to persistent diarrhea from IBS.     Insomnia  This patient states she has had chronic insomnia, sleeping only 3-4 hours per night for many months and potentially years. She is refusing a sleep study at this time as her previous sleep study approximately 5 years ago was a very uncomfortable experience for her. She was recommended to have a sleep study completed at her emergency room visit on April 24, 2018 but refuses this testing. She states she does have a history of apnea but states she has not been experiencing the symptoms of waking up, catching her breath as she had previously experienced so she does not believe she has apnea any longer despite her continued weight gain.    Encounter for examination following treatment at hospital  This patient was recently seen in the emergency room for shortness of breath for 3 days. She was determined not to have any abnormal testing with the exception of splenomegaly so was discharged home in stable condition. She has follow-up appointments with cardiology and oncology. Her recent echo was within normal limits.    Sciatic nerve pain, left  This patient states that after walking with her coats she develops left-sided  sciatic nerve pain at least 2 or 3 times per week. She has not tried nor does she wish to try any medication to alleviate this. She denies numbness or tingling in her feet and denies bowel or bladder incontinence.       Patient Active Problem List    Diagnosis Date Noted   • Splenomegaly 05/10/2018   • Insomnia 05/10/2018   • Encounter for examination following treatment at hospital 05/10/2018   • Sciatic nerve pain, left 05/10/2018   • Herpes zoster without complication 03/13/2018   • Iron deficiency anemia 03/13/2018   • Nausea 03/13/2018   • Seasonal allergic rhinitis 09/12/2017   • Obesity (BMI 30-39.9) 09/12/2017   • Atypical lobular hyperplasia of breast 01/11/2017   • Lobular hyperplasia, atypical, breast 12/21/2016   • Depression 05/19/2016   • Abnormal mammogram 03/16/2016   • Skin lesion 02/18/2016   • Gastroesophageal reflux disease without esophagitis 10/29/2015   • IBS (irritable bowel syndrome) 10/29/2015   • Hiatal hernia 10/29/2015   • Hyperlipidemia 10/29/2015   • HTN (hypertension) 10/26/2010       Allergies:Azithromycin; Ciprofloxacin hcl; Clindamycin; Gabapentin; Lyrica; Pcn [penicillins]; Valtrex [valacyclovir hcl]; and Vicodin [hydrocodone-acetaminophen]    Current Outpatient Prescriptions   Medication Sig Dispense Refill   • ferrous sulfate (FEOSOL) 220 (44 Fe) MG/5ML Elixir Take 5 mL by mouth every day. 1 Bottle 2   • benzonatate (TESSALON) 100 MG Cap Take 1 Cap by mouth 3 times a day as needed for Cough. 60 Cap 0   • metoprolol (LOPRESSOR) 25 MG Tab Take 1 Tab by mouth 2 times a day. TAKE ONE TABLET BY MOUTH TWICE A  Tab 1   • ondansetron (ZOFRAN) 4 MG Tab tablet Take 2 Tabs by mouth every four hours as needed for Nausea/Vomiting. 60 Tab 3   • omeprazole (PRILOSEC) 20 MG delayed-release capsule TAKE ONE CAPSULE BY MOUTH TWICE A  Cap 1   • lisinopril-hydrochlorothiazide (PRINZIDE, ZESTORETIC) 20-25 MG per tablet TAKE ONE TABLET BY MOUTH DAILY 90 Tab 1   • albuterol 108 (90 Base)  MCG/ACT Aero Soln inhalation aerosol Inhale 2 Puffs by mouth every 6 hours as needed for Shortness of Breath. 8.5 g 0   • fluticasone (FLONASE) 50 MCG/ACT nasal spray Spray 2 Sprays in nose every day. 1 Bottle 1   • dicyclomine (BENTYL) 10 MG Cap Take 1 Cap by mouth 4 Times a Day,Before Meals and at Bedtime. 120 Cap 1   • hyoscyamine (ANASPAZ, LEVSIN) 0.125 MG tablet Take 1 Tab by mouth every four hours as needed for Cramping. 60 Tab 2   • raloxifene (EVISTA) 60 MG Tab Take 1 Tab by mouth every day. 90 Tab 5   • lisinopril (PRINIVIL) 20 MG Tab TAKE ONE TABLET BY MOUTH EVERY NIGHT AT BEDTIME 90 Tab 3   • ketoconazole (NIZORAL) 2 % Cream Apply liberally to affected skin once a day 1 Tube 5   • DiphenhydrAMINE HCl, Sleep, (UNISOM SLEEPGELS PO) Take  by mouth.     • metoclopramide (REGLAN) 5 MG tablet Take 1 Tab by mouth 3 times a day before meals. 120 Tab 2   • vitamin D (CHOLECALCIFEROL) 1000 UNIT Tab Take 1,000 Units by mouth every day.     • aspirin 81 MG tablet Take 4 Tabs by mouth every day. 100 Tab 3     No current facility-administered medications for this visit.        Social History   Substance Use Topics   • Smoking status: Former Smoker   • Smokeless tobacco: Never Used   • Alcohol use 0.0 oz/week      Comment: rarely, 3 times per year       Family Status   Relation Status   • Mother    • Father    • Sister Alive   • Brother Alive   History reviewed. No pertinent family history.    Review of Systems:   Constitutional: Negative for fever, chills, weight loss and malaise. Positive for fatigue.   HENT: Negative for ear pain, nosebleeds, congestion, sore throat and neck pain.     Eyes: Negative for blurred vision.   Respiratory: Positive for shortness of breath. Negative for cough, sputum production and wheezing.    Cardiovascular: Negative for chest pain, palpitations, orthopnea and leg swelling.   Gastrointestinal: Negative for heartburn, nausea, vomiting and abdominal pain.    Genitourinary:  Negative for dysuria, urgency and positive for frequency.   Musculoskeletal: Negative for myalgias, back pain and joint pain.   Skin: Negative for rash and itching.   Neurological: Negative for dizziness, tingling, tremors, sensory change, focal weakness and headaches. See HPI above.   Endo/Heme/Allergies: Does not bruise/bleed easily.   Psychiatric/Behavioral: Negative for depression, suicidal ideas and memory loss.  The patient is nervous/anxious and does have insomnia bur refuses medication for this.      Exam:  Blood pressure 118/80, pulse 67, temperature 37 °C (98.6 °F), resp. rate 14, height 1.524 m (5'), weight 91.2 kg (201 lb), SpO2 96 %.  Body mass index is 39.26 kg/m².  General:  Obese female in NAD  Head: is grossly normal  Neck: Thick and short without masses. Thyroid is not visibly enlarged.  Pulmonary: Clear to ausculation. Normal effort. No rales, ronchi, or wheezing.  Cardiovascular: Regular rate and rhythm without murmur. Carotid and radial pulses are intact and equal bilaterally.  Extremities: no clubbing, cyanosis, or edema.    Medical decision-making and discussion:  1. Splenomegaly  Reassess after branden levels are wnl.     2. Iron deficiency anemia, unspecified iron deficiency anemia type  - ferrous sulfate (FEOSOL) 220 (44 Fe) MG/5ML Elixir; Take 5 mL by mouth every day.  Dispense: 1 Bottle; Refill: 2  - IRON/TOTAL IRON BIND; Future    3. Insomnia, unspecified type  This patient is refusing prescription medication for this at this time. She states that a combination of honey and pink himalayan salt in warm water tends to work best to help her sleep.     4. Encounter for examination following treatment at hospital  This patient is doing well at this time.       Please note that this dictation was created using voice recognition software. I have made every reasonable attempt to correct obvious errors, but I expect that there are errors of grammar and possibly content that I did not discover before  finalizing the note.      Return in about 4 weeks (around 6/7/2018) for iron, breathing, sleep.

## 2018-06-01 ENCOUNTER — OFFICE VISIT (OUTPATIENT)
Dept: CARDIOLOGY | Facility: MEDICAL CENTER | Age: 63
End: 2018-06-01
Payer: COMMERCIAL

## 2018-06-01 ENCOUNTER — TELEPHONE (OUTPATIENT)
Dept: CARDIOLOGY | Facility: MEDICAL CENTER | Age: 63
End: 2018-06-01

## 2018-06-01 VITALS
WEIGHT: 195 LBS | BODY MASS INDEX: 38.28 KG/M2 | SYSTOLIC BLOOD PRESSURE: 122 MMHG | HEIGHT: 60 IN | HEART RATE: 85 BPM | DIASTOLIC BLOOD PRESSURE: 82 MMHG | OXYGEN SATURATION: 95 %

## 2018-06-01 DIAGNOSIS — R00.2 PALPITATIONS: ICD-10-CM

## 2018-06-01 DIAGNOSIS — R06.09 DYSPNEA ON EXERTION: ICD-10-CM

## 2018-06-01 DIAGNOSIS — I10 ESSENTIAL HYPERTENSION: ICD-10-CM

## 2018-06-01 DIAGNOSIS — E78.2 MIXED HYPERLIPIDEMIA: ICD-10-CM

## 2018-06-01 DIAGNOSIS — E66.9 OBESITY (BMI 30-39.9): ICD-10-CM

## 2018-06-01 DIAGNOSIS — E66.9 OBESITY (BMI 35.0-39.9 WITHOUT COMORBIDITY): ICD-10-CM

## 2018-06-01 DIAGNOSIS — D50.9 IRON DEFICIENCY ANEMIA, UNSPECIFIED IRON DEFICIENCY ANEMIA TYPE: ICD-10-CM

## 2018-06-01 PROCEDURE — 99204 OFFICE O/P NEW MOD 45 MIN: CPT | Performed by: INTERNAL MEDICINE

## 2018-06-01 RX ORDER — MAGNESIUM OXIDE/MAG AA CHELATE 300 MG
CAPSULE ORAL
COMMUNITY

## 2018-06-01 ASSESSMENT — ENCOUNTER SYMPTOMS
MEMORY LOSS: 1
BACK PAIN: 1
DIARRHEA: 1
COUGH: 1
ABDOMINAL PAIN: 1
HEARTBURN: 1
TINGLING: 1
PALPITATIONS: 1
NAUSEA: 1
WEAKNESS: 1
MYALGIAS: 1
INSOMNIA: 1
CONSTIPATION: 1
VOMITING: 1
BRUISES/BLEEDS EASILY: 1
SHORTNESS OF BREATH: 1

## 2018-06-01 NOTE — PROGRESS NOTES
Subjective:   Chief Complaint:   Chief Complaint   Patient presents with   • Shortness of Breath   • Palpitations       Yesenia Huff is a 63 y.o. female who is referred for ER visit follow-up by Dr. Bharat Welch.  She reported shortness of breath for 1 week associated with palpitations. Her evaluation was unremarkable.  She has had palpitations since childhood.  She has been on metoprolol and they are not bothersome.  She wakes up short of breath during the night.  She states it wakes her up is that she can breathe through her nose.  She has been treated for seasonal allergies.  She is wondering if she has a sinus infection.  She is not limited by chest pains.  She does have dyspnea on exertion.  No syncope.  She has gained a significant amount of weight over the past few years.  She has a mild cough.  She has a smoking history and quit 8 years ago but continues to vapor.  She has never had pulmonary function studies done.  Her iron deficiency anemia is related to irritable bowel syndrome and poor absorption but never had blood loss anemia.  She just came off of Evista which she was on after she had precancer cells in the left breast but did not require chemo or radiation.    The emergency room provider contemplated that she may have some COPD and also some sleep apnea.  She was reluctant to pursue a sleep evaluation.    DATA REVIEWED by me:  ECG 5/6/2018, sinus rhythm, rate 76, borderline left axis    Echo 5/4/2018 LVEF of 70%, mildly dilated left atrium, mild mitral annular calcification, RVSP 25 mmHg    Nuclear stress test 3/28/2014 no evidence of ischemia or infarct, EF 62%    CT PE study 4/24/2018 No evidence of pulmonary embolism, splenomegaly and fatty liver noted, no mention of calcified coronary disease    Most recent labs:   April 24, 2018 hemoglobin 11, platelets 125, MCV 79, sodium 136, potassium 3.8, creatinine 0.62, LFTs normal, troponin less than 0.02,     March 7, 2018 , HDL 40,  triglycerides 171, total cholesterol 212, , free T4 0.82    Past Medical History:   Diagnosis Date   • Abnormal mammogram 3/16/2016   • GERD (gastroesophageal reflux disease)    • Hiatal hernia 10/29/2015   • HTN (hypertension)    • IBS (irritable bowel syndrome)    • Insomnia    • Pneumonia    • Trigger finger of right hand 3/16/2016   • Vitamin D deficiency 2015     History reviewed. No pertinent surgical history.  Family History   Problem Relation Age of Onset   • Heart Disease Mother 75     CABG, valve surgery,  of AAA rupture, HCM   • Arrythmia Father      atrial tachycardia   • Heart Disease Maternal Uncle 50     Social History     Social History   • Marital status: Single     Spouse name: N/A   • Number of children: N/A   • Years of education: N/A     Occupational History   • Not on file.     Social History Main Topics   • Smoking status: Former Smoker   • Smokeless tobacco: Never Used   • Alcohol use 0.0 oz/week      Comment: rarely, 3 times per year   • Drug use: No   • Sexual activity: No     Other Topics Concern   • Not on file     Social History Narrative   • No narrative on file     Allergies   Allergen Reactions   • Statins [Hmg-Coa-R Inhibitors] Myalgia   • Azithromycin      Heart racing   • Ciprofloxacin Hcl    • Clindamycin    • Gabapentin Itching     Itching on back of head.   • Lyrica    • Pcn [Penicillins]    • Valtrex [Valacyclovir Hcl]    • Vicodin [Hydrocodone-Acetaminophen]        Current Outpatient Prescriptions   Medication Sig Dispense Refill   • Magnesium 300 MG Cap Take  by mouth.     • metoprolol (LOPRESSOR) 25 MG Tab Take 1 Tab by mouth 2 times a day. TAKE ONE TABLET BY MOUTH TWICE A  Tab 1   • omeprazole (PRILOSEC) 20 MG delayed-release capsule TAKE ONE CAPSULE BY MOUTH TWICE A  Cap 1   • lisinopril-hydrochlorothiazide (PRINZIDE, ZESTORETIC) 20-25 MG per tablet TAKE ONE TABLET BY MOUTH DAILY 90 Tab 1   • lisinopril (PRINIVIL) 20 MG Tab TAKE ONE TABLET BY  MOUTH EVERY NIGHT AT BEDTIME 90 Tab 3   • vitamin D (CHOLECALCIFEROL) 1000 UNIT Tab Take 1,000 Units by mouth every day.       No current facility-administered medications for this visit.        Review of Systems   Constitutional: Positive for malaise/fatigue.   HENT: Positive for congestion.    Eyes: Eye pain:      Respiratory: Positive for cough and shortness of breath.    Cardiovascular: Positive for palpitations.   Gastrointestinal: Positive for abdominal pain, constipation, diarrhea, heartburn, nausea and vomiting.   Genitourinary: Positive for frequency and urgency.   Musculoskeletal: Positive for back pain, joint pain and myalgias.   Neurological: Positive for tingling and weakness.   Endo/Heme/Allergies: Positive for environmental allergies. Bruises/bleeds easily.   Psychiatric/Behavioral: Positive for memory loss. The patient has insomnia.      All others systems reviewed and negative.     Objective:     Blood pressure 122/82, pulse 85, height 1.524 m (5'), weight 88.5 kg (195 lb), SpO2 95 %. Body mass index is 38.08 kg/m².    Physical Exam   General: No acute distress. Well nourished.  HEENT: EOM grossly intact, no scleral icterus, no pharyngeal erythema.   Neck:  No JVD, no bruits, trachea midline  CVS: RRR. Normal S1, S2. 1/6 ANY RSB. No LE edema.  2+ radial pulses, 2+ DT pulses  Resp: CTAB. No wheezing or crackles/rhonchi. Normal respiratory effort.  Abdomen: Soft, NT, no raquel hepatomegaly, obese.  MSK/Ext: No clubbing or cyanosis.  Skin: Warm and dry, no rashes.  Neurological: CN III-XII grossly intact. No focal deficits.   Psych: A&O x 3, appropriate affect, good judgement      Assessment:     1. Essential hypertension     2. Mixed hyperlipidemia     3. Obesity (BMI 30-39.9)     4. Iron deficiency anemia, unspecified iron deficiency anemia type     5. Dyspnea on exertion  PULMONARY FUNCTION TESTS Test requested: Complete Pulmonary Function Test    REFERRAL TO PULMONOLOGY   6. Obesity (BMI 35.0-39.9  without comorbidity)  REFERRAL TO PULMONOLOGY   7. Palpitations         Medical Decision Making:  Today's Assessment / Status / Plan:     1. Dyspnea on exertion  2. HTN- 3 meds, no changes for a while, has been on HCTZ for many, many years  3. HLP, statin intolerance  4. Possible DC, doesn't want a sleep study  5. Palpitations  6. Iron def anemia, due to IBS and poor absorption  7. Fatigue  8. Mother  of AAA (pt has been tested) had valve replaced, may have had HCM.  9. Prior tobacco, no vapes      -At this point I do not find a primary cardiac cause for her dyspnea.  She does have some mitral valve calcification and I cannot completely verify that the aortic valve is trileaflet but there is no significant diastolic dysfunction.  The left atrium does appear borderline dilated which can be a set up for arrhythmias.  She is already on hydrochlorothiazide and appears euvolemic so I do not think there is any benefit to Lasix challenge at this time.  I have asked her to pursue other possible diagnoses to explain her symptoms including sleep apnea and possibly COPD.    -Restart ASA 81 mg since she has not had GI bleeding  -Eval for DC- Referral made  -PFTs ordered  -Consider ENT      Return in about 3 months (around 2018).    It is my pleasure to participate in the care of Ms. Huff.  Please do not hesitate to contact me with questions or concerns.    Clary Dubon MD, [facility]  Cardiologist [facility]    Please note that this dictation was created using voice recognition software. I have made every reasonable attempt to correct obvious errors, but it is possible there are errors of grammar and possibly content that I did not discover before finalizing the note.

## 2018-06-01 NOTE — LETTER
[facility]   [address]  [place] [address]  Phone: [phone]  Fax: [phone]              Yesenia Huff  1955    Encounter Date: 6/1/2018    Clary Dubon M.D.          PROGRESS NOTE:  Subjective:   Chief Complaint:   Chief Complaint   Patient presents with   • Shortness of Breath   • Palpitations       Yesenia Huff is a 63 y.o. female who is referred for ER visit follow-up by Dr. Bharat Welch.  She reported shortness of breath for 1 week associated with palpitations. Her evaluation was unremarkable.  She has had palpitations since childhood.  She has been on metoprolol and they are not bothersome.  She wakes up short of breath during the night.  She states it wakes her up is that she can breathe through her nose.  She has been treated for seasonal allergies.  She is wondering if she has a sinus infection.  She is not limited by chest pains.  She does have dyspnea on exertion.  No syncope.  She has gained a significant amount of weight over the past few years.  She has a mild cough.  She has a smoking history and quit 8 years ago but continues to vapor.  She has never had pulmonary function studies done.  Her iron deficiency anemia is related to irritable bowel syndrome and poor absorption but never had blood loss anemia.  She just came off of Evista which she was on after she had precancer cells in the left breast but did not require chemo or radiation.    The emergency room provider contemplated that she may have some COPD and also some sleep apnea.  She was reluctant to pursue a sleep evaluation.    DATA REVIEWED by me:  ECG 5/6/2018, sinus rhythm, rate 76, borderline left axis    Echo 5/4/2018 LVEF of 70%, mildly dilated left atrium, mild mitral annular calcification, RVSP 25 mmHg    Nuclear stress test 3/28/2014 no evidence of ischemia or infarct, EF 62%    CT PE study 4/24/2018 No evidence of pulmonary embolism, splenomegaly and  fatty liver noted, no mention of calcified coronary disease    Most recent labs:   2018 hemoglobin 11, platelets 125, MCV 79, sodium 136, potassium 3.8, creatinine 0.62, LFTs normal, troponin less than 0.02,     2018 , HDL 40, triglycerides 171, total cholesterol 212, , free T4 0.82    Past Medical History:   Diagnosis Date   • Abnormal mammogram 3/16/2016   • GERD (gastroesophageal reflux disease)    • Hiatal hernia 10/29/2015   • HTN (hypertension)    • IBS (irritable bowel syndrome)    • Insomnia    • Pneumonia    • Trigger finger of right hand 3/16/2016   • Vitamin D deficiency 2015     History reviewed. No pertinent surgical history.  Family History   Problem Relation Age of Onset   • Heart Disease Mother 75     CABG, valve surgery,  of AAA rupture, HCM   • Arrythmia Father      atrial tachycardia   • Heart Disease Maternal Uncle 50     Social History     Social History   • Marital status: Single     Spouse name: N/A   • Number of children: N/A   • Years of education: N/A     Occupational History   • Not on file.     Social History Main Topics   • Smoking status: Former Smoker   • Smokeless tobacco: Never Used   • Alcohol use 0.0 oz/week      Comment: rarely, 3 times per year   • Drug use: No   • Sexual activity: No     Other Topics Concern   • Not on file     Social History Narrative   • No narrative on file     Allergies   Allergen Reactions   • Statins [Hmg-Coa-R Inhibitors] Myalgia   • Azithromycin      Heart racing   • Ciprofloxacin Hcl    • Clindamycin    • Gabapentin Itching     Itching on back of head.   • Lyrica    • Pcn [Penicillins]    • Valtrex [Valacyclovir Hcl]    • Vicodin [Hydrocodone-Acetaminophen]        Current Outpatient Prescriptions   Medication Sig Dispense Refill   • Magnesium 300 MG Cap Take  by mouth.     • metoprolol (LOPRESSOR) 25 MG Tab Take 1 Tab by mouth 2 times a day. TAKE ONE TABLET BY MOUTH TWICE A  Tab 1   • omeprazole  (PRILOSEC) 20 MG delayed-release capsule TAKE ONE CAPSULE BY MOUTH TWICE A  Cap 1   • lisinopril-hydrochlorothiazide (PRINZIDE, ZESTORETIC) 20-25 MG per tablet TAKE ONE TABLET BY MOUTH DAILY 90 Tab 1   • lisinopril (PRINIVIL) 20 MG Tab TAKE ONE TABLET BY MOUTH EVERY NIGHT AT BEDTIME 90 Tab 3   • vitamin D (CHOLECALCIFEROL) 1000 UNIT Tab Take 1,000 Units by mouth every day.       No current facility-administered medications for this visit.        Review of Systems   Constitutional: Positive for malaise/fatigue.   HENT: Positive for congestion.    Eyes: Eye pain:      Respiratory: Positive for cough and shortness of breath.    Cardiovascular: Positive for palpitations.   Gastrointestinal: Positive for abdominal pain, constipation, diarrhea, heartburn, nausea and vomiting.   Genitourinary: Positive for frequency and urgency.   Musculoskeletal: Positive for back pain, joint pain and myalgias.   Neurological: Positive for tingling and weakness.   Endo/Heme/Allergies: Positive for environmental allergies. Bruises/bleeds easily.   Psychiatric/Behavioral: Positive for memory loss. The patient has insomnia.      All others systems reviewed and negative.     Objective:     Blood pressure 122/82, pulse 85, height 1.524 m (5'), weight 88.5 kg (195 lb), SpO2 95 %. Body mass index is 38.08 kg/m².    Physical Exam   General: No acute distress. Well nourished.  HEENT: EOM grossly intact, no scleral icterus, no pharyngeal erythema.   Neck:  No JVD, no bruits, trachea midline  CVS: RRR. Normal S1, S2. 1/6 ANY RSB. No LE edema.  2+ radial pulses, 2+ DT pulses  Resp: CTAB. No wheezing or crackles/rhonchi. Normal respiratory effort.  Abdomen: Soft, NT, no raquel hepatomegaly, obese.  MSK/Ext: No clubbing or cyanosis.  Skin: Warm and dry, no rashes.  Neurological: CN III-XII grossly intact. No focal deficits.   Psych: A&O x 3, appropriate affect, good judgement      Assessment:     1. Essential hypertension     2. Mixed  hyperlipidemia     3. Obesity (BMI 30-39.9)     4. Iron deficiency anemia, unspecified iron deficiency anemia type     5. Dyspnea on exertion  PULMONARY FUNCTION TESTS Test requested: Complete Pulmonary Function Test    REFERRAL TO PULMONOLOGY   6. Obesity (BMI 35.0-39.9 without comorbidity)  REFERRAL TO PULMONOLOGY   7. Palpitations         Medical Decision Making:  Today's Assessment / Status / Plan:     1. Dyspnea on exertion  2. HTN- 3 meds, no changes for a while, has been on HCTZ for many, many years  3. HLP, statin intolerance  4. Possible DC, doesn't want a sleep study  5. Palpitations  6. Iron def anemia, due to IBS and poor absorption  7. Fatigue  8. Mother  of AAA (pt has been tested) had valve replaced, may have had HCM.  9. Prior tobacco, no vapes      -At this point I do not find a primary cardiac cause for her dyspnea.  She does have some mitral valve calcification and I cannot completely verify that the aortic valve is trileaflet but there is no significant diastolic dysfunction.  The left atrium does appear borderline dilated which can be a set up for arrhythmias.  She is already on hydrochlorothiazide and appears euvolemic so I do not think there is any benefit to Lasix challenge at this time.  I have asked her to pursue other possible diagnoses to explain her symptoms including sleep apnea and possibly COPD.    -Restart ASA 81 mg since she has not had GI bleeding  -Eval for DC- Referral made  -PFTs ordered  -Consider ENT      Return in about 3 months (around 2018).    It is my pleasure to participate in the care of Ms. Huff.  Please do not hesitate to contact me with questions or concerns.    Clary Dubon MD, [facility]  Cardiologist [facility]    Please note that this dictation was created using voice recognition software. I have made every reasonable attempt to correct obvious errors, but it is possible there are errors of grammar and possibly  content that I did not discover before finalizing the note.      Ana Zuluaga P.A.-C.  [address] [address]  [address]  [facility] [address]  VIA In Basket

## 2018-06-04 ENCOUNTER — TELEPHONE (OUTPATIENT)
Dept: CARDIOLOGY | Facility: MEDICAL CENTER | Age: 63
End: 2018-06-04

## 2018-06-11 ENCOUNTER — OFFICE VISIT (OUTPATIENT)
Dept: MEDICAL GROUP | Facility: CLINIC | Age: 63
End: 2018-06-11
Payer: COMMERCIAL

## 2018-06-11 DIAGNOSIS — K58.2 IRRITABLE BOWEL SYNDROME WITH BOTH CONSTIPATION AND DIARRHEA: ICD-10-CM

## 2018-06-11 DIAGNOSIS — R06.81 APNEA: ICD-10-CM

## 2018-06-11 DIAGNOSIS — K21.9 GASTROESOPHAGEAL REFLUX DISEASE WITHOUT ESOPHAGITIS: ICD-10-CM

## 2018-06-11 DIAGNOSIS — E66.9 OBESITY (BMI 35.0-39.9 WITHOUT COMORBIDITY): ICD-10-CM

## 2018-06-11 PROBLEM — Z09 ENCOUNTER FOR EXAMINATION FOLLOWING TREATMENT AT HOSPITAL: Status: RESOLVED | Noted: 2018-05-10 | Resolved: 2018-06-11

## 2018-06-11 PROCEDURE — 99214 OFFICE O/P EST MOD 30 MIN: CPT | Performed by: PHYSICIAN ASSISTANT

## 2018-06-12 VITALS
SYSTOLIC BLOOD PRESSURE: 128 MMHG | DIASTOLIC BLOOD PRESSURE: 78 MMHG | TEMPERATURE: 98.6 F | RESPIRATION RATE: 16 BRPM | OXYGEN SATURATION: 96 % | HEIGHT: 60 IN | WEIGHT: 194 LBS | HEART RATE: 76 BPM | BODY MASS INDEX: 38.09 KG/M2

## 2018-06-12 NOTE — ASSESSMENT & PLAN NOTE
This patient is currently improving her diet and although she has not lost much weight, she is feeling much better with an improved diet.

## 2018-06-12 NOTE — PROGRESS NOTES
Chief Complaint   Patient presents with   • Hypertension     FV        HISTORY OF PRESENT ILLNESS: Patient is a 63 y.o. female established patient who presents today for evaluation and management of:    Obesity (BMI 35.0-39.9 without comorbidity) ([facility])  This patient is currently improving her diet and although she has not lost much weight, she is feeling much better with an improved diet.    IBS (irritable bowel syndrome)  This patient states that she has cut out all refined flowers and sugars from her diet, focusing mostly on a whole foods diet and she states her irritable bowel syndrome has essentially disappeared.  She no longer feels bloated and her bowel movements are regular without being excessively soft or hard.    Gastroesophageal reflux disease without esophagitis  With her recent diet change to a completely dissipated.Full food diet, this patient states her GERD has she does continue to use omeprazole but is amenable to stopping this medication in the near future.    Apnea  This patient has recently been seen by cardiology as well as in the emergency department and physicians in both practices advised that she requires a sleep apnea study.  She is very resistant to completing the study in a sleep lab so is requesting an at home overnight pulse oximetry at this time.  She does have frequent nocturnal awakening and poor sleep with daytime lethargy.       Patient Active Problem List    Diagnosis Date Noted   • Apnea 06/11/2018   • Obesity (BMI 35.0-39.9 without comorbidity) ([facility]) 06/01/2018   • Splenomegaly 05/10/2018   • Insomnia 05/10/2018   • Sciatic nerve pain, left 05/10/2018   • Herpes zoster without complication 03/13/2018   • Iron deficiency anemia 03/13/2018   • Nausea 03/13/2018   • Seasonal allergic rhinitis 09/12/2017   • Atypical lobular hyperplasia of breast 01/11/2017   • Lobular hyperplasia, atypical, breast 12/21/2016   • Depression 05/19/2016   • Abnormal mammogram 03/16/2016   • Skin  lesion 2016   • Gastroesophageal reflux disease without esophagitis 10/29/2015   • IBS (irritable bowel syndrome) 10/29/2015   • Hiatal hernia 10/29/2015   • Hyperlipidemia 10/29/2015   • HTN (hypertension) 10/26/2010       Allergies:Statins [hmg-coa-r inhibitors]; Azithromycin; Ciprofloxacin hcl; Clindamycin; Gabapentin; Lyrica; Pcn [penicillins]; Valtrex [valacyclovir hcl]; and Vicodin [hydrocodone-acetaminophen]    Current Outpatient Prescriptions   Medication Sig Dispense Refill   • ASPIRIN 81 PO Take  by mouth.     • Magnesium 300 MG Cap Take  by mouth.     • metoprolol (LOPRESSOR) 25 MG Tab Take 1 Tab by mouth 2 times a day. TAKE ONE TABLET BY MOUTH TWICE A  Tab 1   • omeprazole (PRILOSEC) 20 MG delayed-release capsule TAKE ONE CAPSULE BY MOUTH TWICE A  Cap 1   • lisinopril-hydrochlorothiazide (PRINZIDE, ZESTORETIC) 20-25 MG per tablet TAKE ONE TABLET BY MOUTH DAILY 90 Tab 1   • lisinopril (PRINIVIL) 20 MG Tab TAKE ONE TABLET BY MOUTH EVERY NIGHT AT BEDTIME 90 Tab 3   • vitamin D (CHOLECALCIFEROL) 1000 UNIT Tab Take 1,000 Units by mouth every day.       No current facility-administered medications for this visit.        Social History   Substance Use Topics   • Smoking status: Former Smoker   • Smokeless tobacco: Never Used   • Alcohol use 0.0 oz/week      Comment: rarely, 3 times per year       Family Status   Relation Status   • Mother    • Father    • Sister Alive   • Brother Alive   • Maternal Uncle      Family History   Problem Relation Age of Onset   • Heart Disease Mother 75     CABG, valve surgery,  of AAA rupture, HCM   • Arrythmia Father      atrial tachycardia   • Heart Disease Maternal Uncle 50       Review of Systems:   Constitutional: Negative for fever, chills, unintentional weight loss and malaise/fatigue.   Eyes: Negative for blurred vision.   Respiratory: Negative for cough, shortness of breath and wheezing.    Cardiovascular: Negative for chest pain,  palpitations  Gastrointestinal: Negative for heartburn, nausea, vomiting and abdominal pain.   Genitourinary: Negative for dysuria, urgency and frequency. Endo/Heme/Allergies: Does not bruise/bleed easily.   Psychiatric/Behavioral: Negative for depression, suicidal ideas and memory loss.  The patient is not nervous/anxious and does not have insomnia.      Exam:  Blood pressure 128/78, pulse 76, temperature 37 °C (98.6 °F), resp. rate 16, height 1.524 m (5'), weight 88 kg (194 lb), SpO2 96 %.  Body mass index is 37.89 kg/m².   General:  Obese female in NAD  Head: is grossly normal.  Neck: Supple without masses. Thyroid is not visibly enlarged.  Pulmonary: Clear to ausculation. Normal effort. No rales, ronchi, or wheezing.  Cardiovascular: Regular rate and rhythm without murmur. Carotid and radial pulses are intact and equal bilaterally.  Extremities: no clubbing, cyanosis, or edema.    Medical decision-making and discussion:  1. Apnea  - OVERNIGHT PULSE OXIMETRY    2. Obesity (BMI 35.0-39.9 without comorbidity) ([facility])  This patient was encouraged to continue on her current diet plan as this will likely result in weight loss as well as improved symptom control.    3. Irritable bowel syndrome with both constipation and diarrhea  This patient was encouraged to continue on her current diet plan.    4. Gastroesophageal reflux disease without esophagitis  see numbers 2 and 3 above.      Please note that this dictation was created using voice recognition software. I have made every reasonable attempt to correct obvious errors, but I expect that there are errors of grammar and possibly content that I did not discover before finalizing the note.      Return in about 3 months (around 9/11/2018) for Obesity, anemia.

## 2018-06-12 NOTE — ASSESSMENT & PLAN NOTE
With her recent diet change to a completely dissipated.Full food diet, this patient states her GERD has she does continue to use omeprazole but is amenable to stopping this medication in the near future.

## 2018-06-13 DIAGNOSIS — K21.9 GASTROESOPHAGEAL REFLUX DISEASE WITHOUT ESOPHAGITIS: ICD-10-CM

## 2018-06-13 RX ORDER — FAMOTIDINE 40 MG/1
40 TABLET, FILM COATED ORAL DAILY
Qty: 90 TAB | Refills: 3 | Status: SHIPPED | OUTPATIENT
Start: 2018-06-13 | End: 2018-09-13

## 2018-06-18 ENCOUNTER — HOSPITAL ENCOUNTER (OUTPATIENT)
Dept: PULMONOLOGY | Facility: MEDICAL CENTER | Age: 63
End: 2018-06-18
Attending: INTERNAL MEDICINE
Payer: COMMERCIAL

## 2018-06-18 PROCEDURE — 94729 DIFFUSING CAPACITY: CPT | Mod: 26 | Performed by: INTERNAL MEDICINE

## 2018-06-18 PROCEDURE — 94060 EVALUATION OF WHEEZING: CPT

## 2018-06-18 PROCEDURE — 94060 EVALUATION OF WHEEZING: CPT | Mod: 26 | Performed by: INTERNAL MEDICINE

## 2018-06-18 PROCEDURE — 94726 PLETHYSMOGRAPHY LUNG VOLUMES: CPT

## 2018-06-18 PROCEDURE — 94729 DIFFUSING CAPACITY: CPT

## 2018-06-18 PROCEDURE — 94726 PLETHYSMOGRAPHY LUNG VOLUMES: CPT | Mod: 26 | Performed by: INTERNAL MEDICINE

## 2018-06-19 NOTE — PROCEDURES
REASON FOR STUDY:  Dyspnea on exertion in a former smoker.  ATS standards are   met.    SPIROMETRY:  FVC is 2.35, 87% predicted.  FEV1 2.0, 93% predicted.  No   significant response to bronchodilators.  FEV1/FVC is 85.    LUNG VOLUMES:  Vital capacity is 2.37, 88% predicted.  ERV is 33% predicted.    TLC is 95% predicted.  RV/TLC is 108% predicted.  Diffusion is increased with   DLCO of 140% predicted.  Flow volume loops show some difficulty with the   maneuver, but it is probably normal.    IMPRESSION:  Normal examination with a suggestion of restrictive disease,   which may be related to the patient's obesity.       ____________________________________     MD DEYANIRA BUENO / BOB    DD:  06/19/2018 08:25:58  DT:  06/19/2018 08:46:56    D#:  [id number]  Job#:  [id number]

## 2018-06-20 ENCOUNTER — TELEPHONE (OUTPATIENT)
Dept: MEDICAL GROUP | Facility: CLINIC | Age: 63
End: 2018-06-20

## 2018-06-20 NOTE — TELEPHONE ENCOUNTER
1. Caller Name: Yesenia                      Call Back Number: [phone] (home)     2. Message: Patient called in and asked why her omeprazole was switched to famotidine.  I read the OV note from the 11th explaining the near future elimination of this medication, however, she was upset this was not explained in that OV that she would not be getting a refill for this. She states she wants to go back and see Naraag and feels that this switch shouldn't have been made without her knowledge.  I offered to speak to Ember regarding this, but she declined.     3. Patient approves office to leave a detailed voicemail/MyChart message: N\A

## 2018-07-06 RX ORDER — LISINOPRIL 20 MG/1
TABLET ORAL
Qty: 90 TAB | Refills: 2 | OUTPATIENT
Start: 2018-07-06

## 2018-07-06 NOTE — TELEPHONE ENCOUNTER
Looks like Ember prescribes combo med with lisinopril in it? Can you confirm with patient med list?   Darrin Huff, APRN

## 2018-07-23 RX ORDER — OMEPRAZOLE 20 MG/1
CAPSULE, DELAYED RELEASE ORAL
Qty: 180 CAP | Refills: 1 | Status: SHIPPED | OUTPATIENT
Start: 2018-07-23

## 2018-07-23 NOTE — TELEPHONE ENCOUNTER
Patient was previously switched to famotidine but does not like it, she has tried it for a month and would like to switch back to omeprazole    Was the patient seen in the last year in this department? Yes     Does patient have an active prescription for medications requested? No     Received Request Via: Patient

## 2018-08-13 RX ORDER — LISINOPRIL 20 MG/1
TABLET ORAL
Qty: 90 TAB | Refills: 0 | Status: SHIPPED | OUTPATIENT
Start: 2018-08-13

## 2018-08-30 ENCOUNTER — TELEPHONE (OUTPATIENT)
Dept: MEDICAL GROUP | Facility: CLINIC | Age: 63
End: 2018-08-30

## 2018-08-30 NOTE — TELEPHONE ENCOUNTER
This patient's recent overnight oximetry study was returned and she does qualify for oxygen over night. She may need to discuss this with a sleep specialist. She was referred to sleep studies back in may 2018 and should follow up with the referral department to schedule this.

## 2018-09-12 DIAGNOSIS — J30.2 SEASONAL ALLERGIC RHINITIS: ICD-10-CM

## 2018-09-12 RX ORDER — FLUTICASONE PROPIONATE 50 MCG
SPRAY, SUSPENSION (ML) NASAL
Qty: 16 G | Refills: 0 | Status: SHIPPED | OUTPATIENT
Start: 2018-09-12

## 2018-09-13 ENCOUNTER — OFFICE VISIT (OUTPATIENT)
Dept: CARDIOLOGY | Facility: MEDICAL CENTER | Age: 63
End: 2018-09-13
Payer: COMMERCIAL

## 2018-09-13 VITALS
BODY MASS INDEX: 37.3 KG/M2 | HEART RATE: 82 BPM | DIASTOLIC BLOOD PRESSURE: 80 MMHG | OXYGEN SATURATION: 97 % | WEIGHT: 190 LBS | HEIGHT: 60 IN | SYSTOLIC BLOOD PRESSURE: 130 MMHG

## 2018-09-13 DIAGNOSIS — R00.2 PALPITATIONS: ICD-10-CM

## 2018-09-13 DIAGNOSIS — E78.2 MIXED HYPERLIPIDEMIA: ICD-10-CM

## 2018-09-13 DIAGNOSIS — R06.09 DYSPNEA ON EXERTION: ICD-10-CM

## 2018-09-13 DIAGNOSIS — D50.9 IRON DEFICIENCY ANEMIA, UNSPECIFIED IRON DEFICIENCY ANEMIA TYPE: ICD-10-CM

## 2018-09-13 DIAGNOSIS — I10 ESSENTIAL HYPERTENSION: ICD-10-CM

## 2018-09-13 DIAGNOSIS — E66.9 OBESITY (BMI 30-39.9): ICD-10-CM

## 2018-09-13 PROCEDURE — 99214 OFFICE O/P EST MOD 30 MIN: CPT | Performed by: INTERNAL MEDICINE

## 2018-09-13 ASSESSMENT — ENCOUNTER SYMPTOMS
VOMITING: 1
ABDOMINAL PAIN: 1
BRUISES/BLEEDS EASILY: 1
MYALGIAS: 1
INSOMNIA: 1
TINGLING: 1
COUGH: 1
MEMORY LOSS: 1
PALPITATIONS: 1
DIARRHEA: 1
HEARTBURN: 1
SHORTNESS OF BREATH: 1
BACK PAIN: 1
WEAKNESS: 1
NAUSEA: 1
CONSTIPATION: 1

## 2018-09-13 NOTE — PROGRESS NOTES
Subjective:   Chief Complaint:   Chief Complaint   Patient presents with   • Hypertension     Follow up       Yesenia Huff is a 63 y.o. female who is referred for ER visit follow-up by Dr. Bharat Welch. She reported shortness of breath for 1 week associated with palpitations. Her evaluation was unremarkable. She has had palpitations since childhood. She has been on metoprolol and they are not bothersome. She wakes up short of breath during the night. She states it wakes her up is that she can breathe through her nose. She has been treated for seasonal allergies. She is wondering if she has a sinus infection. She is not limited by chest pains. She does have dyspnea on exertion. No syncope. She has gained a significant amount of weight over the past few years. She has a mild cough. She has a smoking history and quit 8 years ago but continues to vapor. She has never had pulmonary function studies done.     Her iron deficiency anemia is related to irritable bowel syndrome and poor absorption but never had blood loss anemia. Iron panel pending.    She just came off of Evista which she was on after she had precancer cells in the left breast but did not require chemo or radiation. Sees Onco, might be released sooner rather than later.  ?  The emergency room provider contemplated that she may have some COPD and also some sleep apnea. She was reluctant to pursue a sleep evaluation.  ?  DATA REVIEWED by me:  ECG 5/6/2018, sinus rhythm, rate 76, borderline left axis    Echo 5/4/2018 LVEF of 70%, mildly dilated left atrium, mild mitral annular calcification, RVSP 25 mmHg  ?  Nuclear stress test 3/28/2014 no evidence of ischemia or infarct, EF 62%  ?  CT PE study 4/24/2018 No evidence of pulmonary embolism, splenomegaly and fatty liver noted, no mention of calcified coronary disease    Holter remotely around 2008, 24 hours, we don't have results, pt said the palpitations stopped during the test    Most recent labs:   April  2018 hemoglobin 11, platelets 125, MCV 79, sodium 136, potassium 3.8, creatinine 0.62, LFTs normal, troponin less than 0.02,   ?  2018 , HDL 40, triglycerides 171, total cholesterol 212, , free T4 0.82      Past Medical History:   Diagnosis Date   • Abnormal mammogram 3/16/2016   • GERD (gastroesophageal reflux disease)    • Hiatal hernia 10/29/2015   • HTN (hypertension)    • IBS (irritable bowel syndrome)    • Insomnia    • Pneumonia    • Trigger finger of right hand 3/16/2016   • Vitamin D deficiency 2015     History reviewed. No pertinent surgical history.  Family History   Problem Relation Age of Onset   • Heart Disease Mother 75        CABG, valve surgery,  of AAA rupture, HCM   • Arrythmia Father         atrial tachycardia   • Heart Disease Maternal Uncle 50     Social History     Social History   • Marital status: Single     Spouse name: N/A   • Number of children: N/A   • Years of education: N/A     Occupational History   • Not on file.     Social History Main Topics   • Smoking status: Former Smoker   • Smokeless tobacco: Never Used   • Alcohol use 0.0 oz/week      Comment: rarely, 3 times per year   • Drug use: No   • Sexual activity: No     Other Topics Concern   • Not on file     Social History Narrative   • No narrative on file     Allergies   Allergen Reactions   • Statins [Hmg-Coa-R Inhibitors] Myalgia   • Azithromycin      Heart racing   • Ciprofloxacin Hcl    • Clindamycin    • Gabapentin Itching     Itching on back of head.   • Lyrica    • Pcn [Penicillins]    • Valtrex [Valacyclovir Hcl]    • Vicodin [Hydrocodone-Acetaminophen]        Current Outpatient Prescriptions   Medication Sig Dispense Refill   • fluticasone (FLONASE) 50 MCG/ACT nasal spray SPRAY TWO SPRAYS IN EACH NOSTRIL ONCE DAILY 16 g 0   • lisinopril (PRINIVIL) 20 MG Tab TAKE ONE TABLET BY MOUTH EVERY NIGHT AT BEDTIME 90 Tab 0   • omeprazole (PRILOSEC) 20 MG delayed-release capsule TAKE ONE CAPSULE  BY MOUTH TWICE A  Cap 1   • lisinopril-hydrochlorothiazide (PRINZIDE, ZESTORETIC) 20-25 MG per tablet TAKE ONE TABLET BY MOUTH DAILY 90 Tab 3   • ASPIRIN 81 PO Take  by mouth.     • Magnesium 300 MG Cap Take  by mouth.     • metoprolol (LOPRESSOR) 25 MG Tab Take 1 Tab by mouth 2 times a day. TAKE ONE TABLET BY MOUTH TWICE A  Tab 1   • vitamin D (CHOLECALCIFEROL) 1000 UNIT Tab Take 1,000 Units by mouth every day.       No current facility-administered medications for this visit.        Review of Systems   Constitutional: Positive for malaise/fatigue.   HENT: Positive for congestion.    Eyes: Eye pain:      Respiratory: Positive for cough and shortness of breath.    Cardiovascular: Positive for palpitations.   Gastrointestinal: Positive for abdominal pain, constipation, diarrhea, heartburn, nausea and vomiting.   Genitourinary: Positive for frequency and urgency.   Musculoskeletal: Positive for back pain, joint pain and myalgias.   Neurological: Positive for tingling and weakness.   Endo/Heme/Allergies: Positive for environmental allergies. Bruises/bleeds easily.   Psychiatric/Behavioral: Positive for memory loss. The patient has insomnia.      All others systems reviewed and negative.     Objective:     Blood pressure 130/80, pulse 82, height 1.524 m (5'), weight 86.2 kg (190 lb), SpO2 97 %. Body mass index is 37.11 kg/m².    Physical Exam   General: No acute distress. Well nourished.  HEENT: EOM grossly intact, no scleral icterus, no pharyngeal erythema.   Neck:  No JVD, no bruits, trachea midline  CVS: RRR. Normal S1, S2. 1/6 ANY RSB. No LE edema.  2+ radial pulses, 2+ DT pulses  Resp: CTAB. No wheezing or crackles/rhonchi. Normal respiratory effort.  Abdomen: Soft, NT, no raquel hepatomegaly, obese.  MSK/Ext: No clubbing or cyanosis.  Skin: Warm and dry, no rashes.  Neurological: CN III-XII grossly intact. No focal deficits.   Psych: A&O x 3, appropriate affect, good judgement      Assessment:     1.  Essential hypertension     2. Mixed hyperlipidemia     3. Obesity (BMI 30-39.9)     4. Iron deficiency anemia, unspecified iron deficiency anemia type     5. Dyspnea on exertion     6. Palpitations         Medical Decision Making:  Today's Assessment / Status / Plan:     1. Dyspnea on exertion  2. HTN- 3 meds, no changes for a while, has been on HCTZ for many, many years  3. HLP, statin intolerance  4. Possible DC, doesn't want a sleep study  5. Palpitations  6. Iron def anemia, due to IBS and poor absorption  7. Fatigue  8. Mother  of AAA (pt has been tested) had valve replaced, may have had HCM.  9. Prior tobacco, no vapes  10. Insomnia  11. Breast CA, prior tx  12. Prior tobacco, quit 8 years ago, PFTs ok  ?  -I do not find a primary cardiac cause for her dyspnea. She does have some mitral valve calcification and I cannot completely verify that the aortic valve is trileaflet but there is no significant diastolic dysfunction. The left atrium does appear borderline dilated which can be a set up for arrhythmias. She is already on hydrochlorothiazide and appears euvolemic so I do not think there is any benefit to Lasix challenge at this time.   -I have asked her to pursue other possible diagnoses to explain her symptoms including sleep apnea and possibly COPD.   -Stay on ASA 81 mg since she has not had GI bleeding  -Iron panel pending    -She would like to continue to come in every 6 months for check up and reassessment which is fine.  We can see her in [place].      Return in about 6 months (around 3/13/2019) for [place] FU in 6 months with me.    It is my pleasure to participate in the care of Ms. Huff.  Please do not hesitate to contact me with questions or concerns.    Clary Dubon MD, [facility]  Cardiologist [facility]    Please note that this dictation was created using voice recognition software. I have made every reasonable attempt to correct obvious errors, but it is  possible there are errors of grammar and possibly content that I did not discover before finalizing the note.

## 2018-09-13 NOTE — LETTER
[facility]   [address], [facility] [address]  [place] [address]  Phone: [phone]  Fax: [phone]              Yesenia Huff  1955    Encounter Date: 9/13/2018    Clary Dubon M.D.          PROGRESS NOTE:  Subjective:   Chief Complaint:   Chief Complaint   Patient presents with   • Hypertension     Follow up       Yesenia Huff is a 63 y.o. female who is referred for ER visit follow-up by Dr. Bharat Welch. She reported shortness of breath for 1 week associated with palpitations. Her evaluation was unremarkable. She has had palpitations since childhood. She has been on metoprolol and they are not bothersome. She wakes up short of breath during the night. She states it wakes her up is that she can breathe through her nose. She has been treated for seasonal allergies. She is wondering if she has a sinus infection. She is not limited by chest pains. She does have dyspnea on exertion. No syncope. She has gained a significant amount of weight over the past few years. She has a mild cough. She has a smoking history and quit 8 years ago but continues to vapor. She has never had pulmonary function studies done.     Her iron deficiency anemia is related to irritable bowel syndrome and poor absorption but never had blood loss anemia. Iron panel pending.    She just came off of Evista which she was on after she had precancer cells in the left breast but did not require chemo or radiation. Sees Onco, might be released sooner rather than later.  ?  The emergency room provider contemplated that she may have some COPD and also some sleep apnea. She was reluctant to pursue a sleep evaluation.  ?  DATA REVIEWED by me:  ECG 5/6/2018, sinus rhythm, rate 76, borderline left axis    Echo 5/4/2018 LVEF of 70%, mildly dilated left atrium, mild mitral annular calcification, RVSP 25 mmHg  ?  Nuclear stress test 3/28/2014 no evidence of ischemia or infarct, EF 62%  ?  CT PE study  2018 No evidence of pulmonary embolism, splenomegaly and fatty liver noted, no mention of calcified coronary disease    Holter remotely around , 24 hours, we don't have results, pt said the palpitations stopped during the test    Most recent labs:   2018 hemoglobin 11, platelets 125, MCV 79, sodium 136, potassium 3.8, creatinine 0.62, LFTs normal, troponin less than 0.02,   ?  2018 , HDL 40, triglycerides 171, total cholesterol 212, , free T4 0.82      Past Medical History:   Diagnosis Date   • Abnormal mammogram 3/16/2016   • GERD (gastroesophageal reflux disease)    • Hiatal hernia 10/29/2015   • HTN (hypertension)    • IBS (irritable bowel syndrome)    • Insomnia    • Pneumonia    • Trigger finger of right hand 3/16/2016   • Vitamin D deficiency 2015     History reviewed. No pertinent surgical history.  Family History   Problem Relation Age of Onset   • Heart Disease Mother 75        CABG, valve surgery,  of AAA rupture, HCM   • Arrythmia Father         atrial tachycardia   • Heart Disease Maternal Uncle 50     Social History     Social History   • Marital status: Single     Spouse name: N/A   • Number of children: N/A   • Years of education: N/A     Occupational History   • Not on file.     Social History Main Topics   • Smoking status: Former Smoker   • Smokeless tobacco: Never Used   • Alcohol use 0.0 oz/week      Comment: rarely, 3 times per year   • Drug use: No   • Sexual activity: No     Other Topics Concern   • Not on file     Social History Narrative   • No narrative on file     Allergies   Allergen Reactions   • Statins [Hmg-Coa-R Inhibitors] Myalgia   • Azithromycin      Heart racing   • Ciprofloxacin Hcl    • Clindamycin    • Gabapentin Itching     Itching on back of head.   • Lyrica    • Pcn [Penicillins]    • Valtrex [Valacyclovir Hcl]    • Vicodin [Hydrocodone-Acetaminophen]        Current Outpatient Prescriptions   Medication Sig Dispense Refill     • fluticasone (FLONASE) 50 MCG/ACT nasal spray SPRAY TWO SPRAYS IN EACH NOSTRIL ONCE DAILY 16 g 0   • lisinopril (PRINIVIL) 20 MG Tab TAKE ONE TABLET BY MOUTH EVERY NIGHT AT BEDTIME 90 Tab 0   • omeprazole (PRILOSEC) 20 MG delayed-release capsule TAKE ONE CAPSULE BY MOUTH TWICE A  Cap 1   • lisinopril-hydrochlorothiazide (PRINZIDE, ZESTORETIC) 20-25 MG per tablet TAKE ONE TABLET BY MOUTH DAILY 90 Tab 3   • ASPIRIN 81 PO Take  by mouth.     • Magnesium 300 MG Cap Take  by mouth.     • metoprolol (LOPRESSOR) 25 MG Tab Take 1 Tab by mouth 2 times a day. TAKE ONE TABLET BY MOUTH TWICE A  Tab 1   • vitamin D (CHOLECALCIFEROL) 1000 UNIT Tab Take 1,000 Units by mouth every day.       No current facility-administered medications for this visit.        Review of Systems   Constitutional: Positive for malaise/fatigue.   HENT: Positive for congestion.    Eyes: Eye pain:      Respiratory: Positive for cough and shortness of breath.    Cardiovascular: Positive for palpitations.   Gastrointestinal: Positive for abdominal pain, constipation, diarrhea, heartburn, nausea and vomiting.   Genitourinary: Positive for frequency and urgency.   Musculoskeletal: Positive for back pain, joint pain and myalgias.   Neurological: Positive for tingling and weakness.   Endo/Heme/Allergies: Positive for environmental allergies. Bruises/bleeds easily.   Psychiatric/Behavioral: Positive for memory loss. The patient has insomnia.      All others systems reviewed and negative.     Objective:     Blood pressure 130/80, pulse 82, height 1.524 m (5'), weight 86.2 kg (190 lb), SpO2 97 %. Body mass index is 37.11 kg/m².    Physical Exam   General: No acute distress. Well nourished.  HEENT: EOM grossly intact, no scleral icterus, no pharyngeal erythema.   Neck:  No JVD, no bruits, trachea midline  CVS: RRR. Normal S1, S2. 1/6 ANY RSB. No LE edema.  2+ radial pulses, 2+ DT pulses  Resp: CTAB. No wheezing or crackles/rhonchi. Normal  respiratory effort.  Abdomen: Soft, NT, no raquel hepatomegaly, obese.  MSK/Ext: No clubbing or cyanosis.  Skin: Warm and dry, no rashes.  Neurological: CN III-XII grossly intact. No focal deficits.   Psych: A&O x 3, appropriate affect, good judgement      Assessment:     1. Essential hypertension     2. Mixed hyperlipidemia     3. Obesity (BMI 30-39.9)     4. Iron deficiency anemia, unspecified iron deficiency anemia type     5. Dyspnea on exertion     6. Palpitations         Medical Decision Making:  Today's Assessment / Status / Plan:     1. Dyspnea on exertion  2. HTN- 3 meds, no changes for a while, has been on HCTZ for many, many years  3. HLP, statin intolerance  4. Possible DC, doesn't want a sleep study  5. Palpitations  6. Iron def anemia, due to IBS and poor absorption  7. Fatigue  8. Mother  of AAA (pt has been tested) had valve replaced, may have had HCM.  9. Prior tobacco, no vapes  10. Insomnia  11. Breast CA, prior tx  12. Prior tobacco, quit 8 years ago, PFTs ok  ?  -I do not find a primary cardiac cause for her dyspnea. She does have some mitral valve calcification and I cannot completely verify that the aortic valve is trileaflet but there is no significant diastolic dysfunction. The left atrium does appear borderline dilated which can be a set up for arrhythmias. She is already on hydrochlorothiazide and appears euvolemic so I do not think there is any benefit to Lasix challenge at this time.   -I have asked her to pursue other possible diagnoses to explain her symptoms including sleep apnea and possibly COPD.   -Stay on ASA 81 mg since she has not had GI bleeding  -Iron panel pending    -She would like to continue to come in every 6 months for check up and reassessment which is fine.  We can see her in [place].      Return in about 6 months (around 3/13/2019) for Fiona FU in 6 months with me.    It is my pleasure to participate in the care of Ms. Huff.  Please do not hesitate to contact  me with questions or concerns.    Clary Dubon MD, [facility]  Cardiologist [facility]    Please note that this dictation was created using voice recognition software. I have made every reasonable attempt to correct obvious errors, but it is possible there are errors of grammar and possibly content that I did not discover before finalizing the note.      Sloan Moyer M.D.  [address] [facility] [address]  VIA Facsimile: [phone]

## 2018-11-14 ENCOUNTER — TELEPHONE (OUTPATIENT)
Dept: HEMATOLOGY ONCOLOGY | Facility: MEDICAL CENTER | Age: 63
End: 2018-11-14

## 2018-11-14 NOTE — TELEPHONE ENCOUNTER
"Patient called and rescheduled upcoming appointment with Dr. Herzog for a 9 month with mammogram results on 11/26/2018. Patient stated \"I need to move my appointment due to my  Mammogram being moved after thanksgiving.\" Patient is scheduled to follow up with Dr. Herzog on 12/19/2018 at 11:20 am.  "

## 2018-11-26 ENCOUNTER — APPOINTMENT (OUTPATIENT)
Dept: HEMATOLOGY ONCOLOGY | Facility: MEDICAL CENTER | Age: 63
End: 2018-11-26
Payer: COMMERCIAL

## 2018-11-27 ENCOUNTER — TELEPHONE (OUTPATIENT)
Dept: HEMATOLOGY ONCOLOGY | Facility: MEDICAL CENTER | Age: 63
End: 2018-11-27

## 2018-11-27 NOTE — TELEPHONE ENCOUNTER
Called patient to reschedule appointment for 12/24. Dr zambrano will be out that day. When patient calls reschulde appointment.

## 2018-12-04 ENCOUNTER — PATIENT OUTREACH (OUTPATIENT)
Dept: HEALTH INFORMATION MANAGEMENT | Facility: OTHER | Age: 63
End: 2018-12-04

## 2018-12-04 NOTE — PROGRESS NOTES
Declined Care Management - QIP colonoscopy, reached out to [facility] and verified that patient did not complete the colonoscopy to where the referral was requested. Patient declined to verify demographics.     Please transfer to Patient Outreach Team at [phone] when patient returns call.    WebIZ Checked & Epic Updated:  yes  MMR   Td (adult), adsorbed   Zoster Aayush (Shingrix)     [facility] Verified: yes    Attempt # 1

## 2018-12-06 ENCOUNTER — TELEPHONE (OUTPATIENT)
Dept: HEMATOLOGY ONCOLOGY | Facility: MEDICAL CENTER | Age: 63
End: 2018-12-06

## 2018-12-06 NOTE — TELEPHONE ENCOUNTER
Called patient to reschedule her appointment with  on 12/24/18 due to  being out of the office. Patient stated she will call back to reschedule.

## 2019-03-18 ENCOUNTER — OFFICE VISIT (OUTPATIENT)
Dept: CARDIOLOGY | Facility: PHYSICIAN GROUP | Age: 64
End: 2019-03-18
Payer: COMMERCIAL

## 2019-03-18 VITALS
BODY MASS INDEX: 37.31 KG/M2 | DIASTOLIC BLOOD PRESSURE: 88 MMHG | OXYGEN SATURATION: 94 % | SYSTOLIC BLOOD PRESSURE: 132 MMHG | HEART RATE: 84 BPM | HEIGHT: 60 IN | WEIGHT: 190.04 LBS

## 2019-03-18 DIAGNOSIS — R06.09 DYSPNEA ON EXERTION: ICD-10-CM

## 2019-03-18 DIAGNOSIS — E66.9 OBESITY (BMI 30-39.9): ICD-10-CM

## 2019-03-18 DIAGNOSIS — R00.2 PALPITATIONS: ICD-10-CM

## 2019-03-18 DIAGNOSIS — I10 ESSENTIAL HYPERTENSION: ICD-10-CM

## 2019-03-18 DIAGNOSIS — Z78.9 STATIN INTOLERANCE: ICD-10-CM

## 2019-03-18 DIAGNOSIS — E78.2 MIXED HYPERLIPIDEMIA: ICD-10-CM

## 2019-03-18 DIAGNOSIS — I70.213 ATHEROSCLEROSIS OF NATIVE ARTERIES OF EXTREMITIES WITH INTERMITTENT CLAUDICATION, BILATERAL LEGS (HCC): ICD-10-CM

## 2019-03-18 DIAGNOSIS — Z82.49 FAMILY HISTORY OF PERIPHERAL VASCULAR DISEASE: ICD-10-CM

## 2019-03-18 DIAGNOSIS — D50.9 IRON DEFICIENCY ANEMIA, UNSPECIFIED IRON DEFICIENCY ANEMIA TYPE: ICD-10-CM

## 2019-03-18 PROCEDURE — 99214 OFFICE O/P EST MOD 30 MIN: CPT | Performed by: INTERNAL MEDICINE

## 2019-03-18 RX ORDER — ATORVASTATIN CALCIUM 10 MG/1
10 TABLET, FILM COATED ORAL DAILY
Qty: 30 TAB | Refills: 11 | Status: SHIPPED | OUTPATIENT
Start: 2019-03-18 | End: 2019-09-16

## 2019-03-18 ASSESSMENT — ENCOUNTER SYMPTOMS
INSOMNIA: 1
PALPITATIONS: 1
DIARRHEA: 1
TINGLING: 1
COUGH: 1
CONSTIPATION: 1
HEARTBURN: 1
SHORTNESS OF BREATH: 1
MYALGIAS: 1
MEMORY LOSS: 1
VOMITING: 1
BACK PAIN: 1
NAUSEA: 1
BRUISES/BLEEDS EASILY: 1
ABDOMINAL PAIN: 1
WEAKNESS: 1

## 2019-03-18 NOTE — PATIENT INSTRUCTIONS
-NIR- leg blood pressure to check for PAD  -Start lipitor 10 mg daily (max dose is 80 mg)  -Repeat cholesterol and liver function in 3 months  -Call the office for ANY symptoms: [phone]  -You should always hear results of testing within 5 days with my interpretation, if you do not, send a [employer] message or call the office: [phone].

## 2019-03-18 NOTE — PROGRESS NOTES
Subjective:   Chief Complaint:   Chief Complaint   Patient presents with   • Hypertension       Yesenia Huff is a 63 y.o. female who is referred for ER visit follow-up by Dr. Bharat Welch for URIBE. She reported shortness of breath for 1 week associated with palpitations. Her evaluation was unremarkable. She has had palpitations since childhood. She has been on metoprolol and they are not bothersome.     She wakes up short of breath during the night. She states it wakes her up is that she can breathe through her nose. Happened more with anemia. She has been treated for seasonal allergies. She is not limited by chest pains.     She does have dyspnea on exertion but no longer bothersome. No syncope. She has gained a significant amount of weight over the past few years. She has a mild cough. She has a smoking history and quit 8 years ago but continues to vapor.  Pulmonary function studies were normal except for possible restriction related to obesity.    Her iron deficiency anemia is related to irritable bowel syndrome and poor absorption but never had blood loss anemia. Iron panel pending.    She just came off of Evista which she was on after she had precancer cells in the left breast but did not require chemo or radiation. Sees Onco, might be released sooner rather than later.  ?  Prior emergency room provider contemplated that she may have some COPD and also some sleep apnea. She was reluctant to pursue a sleep evaluation.  She did have overnight pulse oximetry with her primary care provider which was mildly abnormal.    Blood pressure fairly well controlled today.  Last LDL cholesterol 138 in March 2018.    Prior meds:  Mevacor  Pravastatin  Fenofibrate  ?  DATA REVIEWED by me:  ECG 5/6/2018, sinus rhythm, rate 76, borderline left axis    Echo 5/4/2018 LVEF of 70%, mildly dilated left atrium, mild mitral annular calcification, RVSP 25 mmHg  ?  Nuclear stress test 3/28/2014 no evidence of ischemia or infarct, EF  62%  ?  CT PE study 2018 No evidence of pulmonary embolism, splenomegaly and fatty liver noted, no mention of calcified coronary disease    PFTs 2018  IMPRESSION:  Normal examination with a suggestion of restrictive disease,   which may be related to the patient's obesity.    Holter remotely around , 24 hours, we don't have results, pt said the palpitations stopped during the test    Most recent labs:   3/11/2019 hemoglobin 12.7, platelets 195, glucose 99, sodium 137, potassium 4.3, creatinine 0.74, LFTs normal, total cholesterol 256, HDL 51, , triglycerides 157    2018 hemoglobin 11, platelets 125, MCV 79, sodium 136, potassium 3.8, creatinine 0.62, LFTs normal, troponin less than 0.02,   ?  2018 , HDL 40, triglycerides 171, total cholesterol 212, , free T4 0.82      Past Medical History:   Diagnosis Date   • Abnormal mammogram 3/16/2016   • GERD (gastroesophageal reflux disease)    • Hiatal hernia 10/29/2015   • HTN (hypertension)    • IBS (irritable bowel syndrome)    • Insomnia    • Pneumonia    • Trigger finger of right hand 3/16/2016   • Vitamin D deficiency 2015     History reviewed. No pertinent surgical history.  Family History   Problem Relation Age of Onset   • Heart Disease Mother 75        CABG, valve surgery,  of AAA rupture, HCM   • Arrythmia Father         atrial tachycardia   • Heart Disease Maternal Uncle 50     Social History     Social History   • Marital status: Single     Spouse name: N/A   • Number of children: N/A   • Years of education: N/A     Occupational History   • Not on file.     Social History Main Topics   • Smoking status: Former Smoker   • Smokeless tobacco: Never Used   • Alcohol use 0.0 oz/week      Comment: rarely, 3 times per year   • Drug use: No   • Sexual activity: No     Other Topics Concern   • Not on file     Social History Narrative   • No narrative on file     Allergies   Allergen Reactions   • Statins  [Hmg-Coa-R Inhibitors] Myalgia   • Azithromycin      Heart racing   • Ciprofloxacin Hcl    • Clindamycin    • Gabapentin Itching     Itching on back of head.   • Lyrica    • Pcn [Penicillins]    • Valtrex [Valacyclovir Hcl]    • Vicodin [Hydrocodone-Acetaminophen]        Current Outpatient Prescriptions   Medication Sig Dispense Refill   • atorvastatin (LIPITOR) 10 MG Tab Take 1 Tab by mouth every day. 30 Tab 11   • fluticasone (FLONASE) 50 MCG/ACT nasal spray SPRAY TWO SPRAYS IN EACH NOSTRIL ONCE DAILY 16 g 0   • lisinopril (PRINIVIL) 20 MG Tab TAKE ONE TABLET BY MOUTH EVERY NIGHT AT BEDTIME 90 Tab 0   • omeprazole (PRILOSEC) 20 MG delayed-release capsule TAKE ONE CAPSULE BY MOUTH TWICE A  Cap 1   • lisinopril-hydrochlorothiazide (PRINZIDE, ZESTORETIC) 20-25 MG per tablet TAKE ONE TABLET BY MOUTH DAILY 90 Tab 3   • ASPIRIN 81 PO Take  by mouth.     • Magnesium 300 MG Cap Take  by mouth.     • metoprolol (LOPRESSOR) 25 MG Tab Take 1 Tab by mouth 2 times a day. TAKE ONE TABLET BY MOUTH TWICE A  Tab 1   • vitamin D (CHOLECALCIFEROL) 1000 UNIT Tab Take 1,000 Units by mouth every day.       No current facility-administered medications for this visit.        Review of Systems   Constitutional: Positive for malaise/fatigue.   HENT: Positive for congestion.    Eyes: Eye pain:      Respiratory: Positive for cough and shortness of breath.    Cardiovascular: Positive for palpitations.   Gastrointestinal: Positive for abdominal pain, constipation, diarrhea, heartburn, nausea and vomiting.   Genitourinary: Positive for frequency and urgency.   Musculoskeletal: Positive for back pain, joint pain and myalgias.   Neurological: Positive for tingling and weakness.   Endo/Heme/Allergies: Positive for environmental allergies. Bruises/bleeds easily.   Psychiatric/Behavioral: Positive for memory loss. The patient has insomnia.      All others systems reviewed and negative.     Objective:     Blood pressure 132/88, pulse  84, height 1.524 m (5'), weight 86.2 kg (190 lb 0.6 oz), SpO2 94 %, not currently breastfeeding. Body mass index is 37.11 kg/m².    Physical Exam   General: No acute distress. Well nourished.  HEENT: EOM grossly intact, no scleral icterus, no pharyngeal erythema.   Neck:  No JVD, no bruits, trachea midline  CVS: RRR. Normal S1, S2. 1/6 ANY RSB. No LE edema.  2+ radial pulses, 2+ DT pulses  Resp: CTAB. No wheezing or crackles/rhonchi. Normal respiratory effort.  Abdomen: Soft, NT, no raquel hepatomegaly, obese.  MSK/Ext: No clubbing or cyanosis.  Skin: Warm and dry, no rashes.  Neurological: CN III-XII grossly intact. No focal deficits.   Psych: A&O x 3, appropriate affect, good judgement      Assessment:     1. Essential hypertension     2. Mixed hyperlipidemia  Comp Metabolic Panel    Lipid Profile   3. Obesity (BMI 30-39.9)     4. Iron deficiency anemia, unspecified iron deficiency anemia type     5. Dyspnea on exertion     6. Palpitations     7. Statin intolerance     8. Atherosclerosis of native arteries of extremities with intermittent claudication, bilateral legs (HCC)  US-EXTREMITY ARTERY LOWER BILAT W/NIR (COMBO)    Comp Metabolic Panel    Lipid Profile   9. Family history of peripheral vascular disease         Medical Decision Making:  Today's Assessment / Status / Plan:     1. Dyspnea on exertion, noncardiac  2. HTN- 3 meds, no changes for a while, has been on HCTZ for many, many years  3. HLP, statin intolerance, last LDL cholesterol 138  4. Possible DC, doesn't want a sleep study, have abnl overnight pulse ox  5. Palpitations  6. Iron def anemia, due to IBS and poor absorption  7. Fatigue  8. Mother  of AAA (pt has been tested) had valve replaced, may have had HCM.  9. Prior tobacco, no vapes  10. Insomnia  11. Breast CA, prior tx  12. Prior tobacco, quit 8 years ago, PFTs ok  13. Possible claudication    -She is having elevated cholesterol, will trial low dose statin  -On a low carb diet, will fu  lipid to ensure LDL is not worse  -No need to treat TG level until 500-700, no fenofibrate planned  -She reports statin intolerance but was on fenofibrates  -Again, I have not found a significant cardiac cause for dyspnea. She does have some mitral valve calcification and I cannot completely verify that the aortic valve is trileaflet but there is no significant diastolic dysfunction. The left atrium does appear borderline dilated which can be a set up for arrhythmias. She is already on hydrochlorothiazide and appears euvolemic so I do not think there is any benefit to Lasix challenge at this time. Will monitor  -Support weight loss.  -Stay on ASA 81 mg   -Check for PAD, mother had this  -RTC 6 months with new blood work    Written instructions given today:  -NIR- leg blood pressure to check for PAD  -Start lipitor 10 mg daily (max dose is 80 mg)  -Repeat cholesterol and liver function in 3 months  -Call the office for ANY symptoms: [phone]  -You should always hear results of testing within 5 days with my interpretation, if you do not, send a [employer] message or call the office: [phone].    Return in about 6 months (around 9/18/2019).    It is my pleasure to participate in the care of Ms. Huff.  Please do not hesitate to contact me with questions or concerns.    Clary Dubon MD, [facility]  Cardiologist [facility] for [facility]    Please note that this dictation was created using voice recognition software. I have made every reasonable attempt to correct obvious errors, but it is possible there are errors of grammar and possibly content that I did not discover before finalizing the note.

## 2019-03-18 NOTE — LETTER
[facility] [address]  Phone: [phone]  Fax: [phone]              Yesenia Huff  1955    Encounter Date: 3/18/2019    Clary Dubon M.D.          PROGRESS NOTE:  Subjective:   Chief Complaint:   Chief Complaint   Patient presents with   • Hypertension       Yesenia Huff is a 63 y.o. female who is referred for ER visit follow-up by Dr. Bharat Welch for URIBE. She reported shortness of breath for 1 week associated with palpitations. Her evaluation was unremarkable. She has had palpitations since childhood. She has been on metoprolol and they are not bothersome.     She wakes up short of breath during the night. She states it wakes her up is that she can breathe through her nose. Happened more with anemia. She has been treated for seasonal allergies. She is not limited by chest pains.     She does have dyspnea on exertion but no longer bothersome. No syncope. She has gained a significant amount of weight over the past few years. She has a mild cough. She has a smoking history and quit 8 years ago but continues to vapor.  Pulmonary function studies were normal except for possible restriction related to obesity.    Her iron deficiency anemia is related to irritable bowel syndrome and poor absorption but never had blood loss anemia. Iron panel pending.    She just came off of Evista which she was on after she had precancer cells in the left breast but did not require chemo or radiation. Sees Onco, might be released sooner rather than later.  ?  Prior emergency room provider contemplated that she may have some COPD and also some sleep apnea. She was reluctant to pursue a sleep evaluation.  She did have overnight pulse oximetry with her primary care provider which was mildly abnormal.    Blood pressure fairly well controlled today.  Last LDL cholesterol 138 in March 2018.    Prior  meds:  Mevacor  Pravastatin  Fenofibrate  ?  DATA REVIEWED by me:  ECG 2018, sinus rhythm, rate 76, borderline left axis    Echo 2018 LVEF of 70%, mildly dilated left atrium, mild mitral annular calcification, RVSP 25 mmHg  ?  Nuclear stress test 3/28/2014 no evidence of ischemia or infarct, EF 62%  ?  CT PE study 2018 No evidence of pulmonary embolism, splenomegaly and fatty liver noted, no mention of calcified coronary disease    PFTs 2018  IMPRESSION:  Normal examination with a suggestion of restrictive disease,   which may be related to the patient's obesity.    Holter remotely around , 24 hours, we don't have results, pt said the palpitations stopped during the test    Most recent labs:   3/11/2019 hemoglobin 12.7, platelets 195, glucose 99, sodium 137, potassium 4.3, creatinine 0.74, LFTs normal, total cholesterol 256, HDL 51, , triglycerides 157    2018 hemoglobin 11, platelets 125, MCV 79, sodium 136, potassium 3.8, creatinine 0.62, LFTs normal, troponin less than 0.02,   ?  2018 , HDL 40, triglycerides 171, total cholesterol 212, , free T4 0.82      Past Medical History:   Diagnosis Date   • Abnormal mammogram 3/16/2016   • GERD (gastroesophageal reflux disease)    • Hiatal hernia 10/29/2015   • HTN (hypertension)    • IBS (irritable bowel syndrome)    • Insomnia    • Pneumonia    • Trigger finger of right hand 3/16/2016   • Vitamin D deficiency 2015     History reviewed. No pertinent surgical history.  Family History   Problem Relation Age of Onset   • Heart Disease Mother 75        CABG, valve surgery,  of AAA rupture, HCM   • Arrythmia Father         atrial tachycardia   • Heart Disease Maternal Uncle 50     Social History     Social History   • Marital status: Single     Spouse name: N/A   • Number of children: N/A   • Years of education: N/A     Occupational History   • Not on file.     Social History Main Topics   • Smoking  status: Former Smoker   • Smokeless tobacco: Never Used   • Alcohol use 0.0 oz/week      Comment: rarely, 3 times per year   • Drug use: No   • Sexual activity: No     Other Topics Concern   • Not on file     Social History Narrative   • No narrative on file     Allergies   Allergen Reactions   • Statins [Hmg-Coa-R Inhibitors] Myalgia   • Azithromycin      Heart racing   • Ciprofloxacin Hcl    • Clindamycin    • Gabapentin Itching     Itching on back of head.   • Lyrica    • Pcn [Penicillins]    • Valtrex [Valacyclovir Hcl]    • Vicodin [Hydrocodone-Acetaminophen]        Current Outpatient Prescriptions   Medication Sig Dispense Refill   • atorvastatin (LIPITOR) 10 MG Tab Take 1 Tab by mouth every day. 30 Tab 11   • fluticasone (FLONASE) 50 MCG/ACT nasal spray SPRAY TWO SPRAYS IN EACH NOSTRIL ONCE DAILY 16 g 0   • lisinopril (PRINIVIL) 20 MG Tab TAKE ONE TABLET BY MOUTH EVERY NIGHT AT BEDTIME 90 Tab 0   • omeprazole (PRILOSEC) 20 MG delayed-release capsule TAKE ONE CAPSULE BY MOUTH TWICE A  Cap 1   • lisinopril-hydrochlorothiazide (PRINZIDE, ZESTORETIC) 20-25 MG per tablet TAKE ONE TABLET BY MOUTH DAILY 90 Tab 3   • ASPIRIN 81 PO Take  by mouth.     • Magnesium 300 MG Cap Take  by mouth.     • metoprolol (LOPRESSOR) 25 MG Tab Take 1 Tab by mouth 2 times a day. TAKE ONE TABLET BY MOUTH TWICE A  Tab 1   • vitamin D (CHOLECALCIFEROL) 1000 UNIT Tab Take 1,000 Units by mouth every day.       No current facility-administered medications for this visit.        Review of Systems   Constitutional: Positive for malaise/fatigue.   HENT: Positive for congestion.    Eyes: Eye pain:      Respiratory: Positive for cough and shortness of breath.    Cardiovascular: Positive for palpitations.   Gastrointestinal: Positive for abdominal pain, constipation, diarrhea, heartburn, nausea and vomiting.   Genitourinary: Positive for frequency and urgency.   Musculoskeletal: Positive for back pain, joint pain and myalgias.    Neurological: Positive for tingling and weakness.   Endo/Heme/Allergies: Positive for environmental allergies. Bruises/bleeds easily.   Psychiatric/Behavioral: Positive for memory loss. The patient has insomnia.      All others systems reviewed and negative.     Objective:     Blood pressure 132/88, pulse 84, height 1.524 m (5'), weight 86.2 kg (190 lb 0.6 oz), SpO2 94 %, not currently breastfeeding. Body mass index is 37.11 kg/m².    Physical Exam   General: No acute distress. Well nourished.  HEENT: EOM grossly intact, no scleral icterus, no pharyngeal erythema.   Neck:  No JVD, no bruits, trachea midline  CVS: RRR. Normal S1, S2. 1/6 ANY RSB. No LE edema.  2+ radial pulses, 2+ DT pulses  Resp: CTAB. No wheezing or crackles/rhonchi. Normal respiratory effort.  Abdomen: Soft, NT, no raquel hepatomegaly, obese.  MSK/Ext: No clubbing or cyanosis.  Skin: Warm and dry, no rashes.  Neurological: CN III-XII grossly intact. No focal deficits.   Psych: A&O x 3, appropriate affect, good judgement      Assessment:     1. Essential hypertension     2. Mixed hyperlipidemia  Comp Metabolic Panel    Lipid Profile   3. Obesity (BMI 30-39.9)     4. Iron deficiency anemia, unspecified iron deficiency anemia type     5. Dyspnea on exertion     6. Palpitations     7. Statin intolerance     8. Atherosclerosis of native arteries of extremities with intermittent claudication, bilateral legs (HCC)  US-EXTREMITY ARTERY LOWER BILAT W/NIR (COMBO)    Comp Metabolic Panel    Lipid Profile   9. Family history of peripheral vascular disease         Medical Decision Making:  Today's Assessment / Status / Plan:     1. Dyspnea on exertion, noncardiac  2. HTN- 3 meds, no changes for a while, has been on HCTZ for many, many years  3. HLP, statin intolerance, last LDL cholesterol 138  4. Possible DC, doesn't want a sleep study, have abnl overnight pulse ox  5. Palpitations  6. Iron def anemia, due to IBS and poor absorption  7. Fatigue  8. Mother   of AAA (pt has been tested) had valve replaced, may have had HCM.  9. Prior tobacco, no vapes  10. Insomnia  11. Breast CA, prior tx  12. Prior tobacco, quit 8 years ago, PFTs ok  13. Possible claudication    -She is having elevated cholesterol, will trial low dose statin  -On a low carb diet, will fu lipid to ensure LDL is not worse  -No need to treat TG level until 500-700, no fenofibrate planned  -She reports statin intolerance but was on fenofibrates  -Again, I have not found a significant cardiac cause for dyspnea. She does have some mitral valve calcification and I cannot completely verify that the aortic valve is trileaflet but there is no significant diastolic dysfunction. The left atrium does appear borderline dilated which can be a set up for arrhythmias. She is already on hydrochlorothiazide and appears euvolemic so I do not think there is any benefit to Lasix challenge at this time. Will monitor  -Support weight loss.  -Stay on ASA 81 mg   -Check for PAD, mother had this  -RTC 6 months with new blood work    Written instructions given today:  -NIR- leg blood pressure to check for PAD  -Start lipitor 10 mg daily (max dose is 80 mg)  -Repeat cholesterol and liver function in 3 months  -Call the office for ANY symptoms: [phone]  -You should always hear results of testing within 5 days with my interpretation, if you do not, send a [employer] message or call the office: [phone].    Return in about 6 months (around 2019).    It is my pleasure to participate in the care of Ms. Huff.  Please do not hesitate to contact me with questions or concerns.    Clary Dubon MD, [facility]  Cardiologist [facility]    Please note that this dictation was created using voice recognition software. I have made every reasonable attempt to correct obvious errors, but it is possible there are errors of grammar and possibly content that I did not discover before finalizing the  note.      Ana Zuluaga P.A.-C.  [address]  [facility] [address]  VIA In Basket

## 2019-03-28 DIAGNOSIS — I70.213 ATHEROSCLEROSIS OF NATIVE ARTERIES OF EXTREMITIES WITH INTERMITTENT CLAUDICATION, BILATERAL LEGS (HCC): ICD-10-CM

## 2019-04-16 ENCOUNTER — TELEPHONE (OUTPATIENT)
Dept: CARDIOLOGY | Facility: MEDICAL CENTER | Age: 64
End: 2019-04-16

## 2019-04-16 NOTE — TELEPHONE ENCOUNTER
Hold lipitor 2-4 weeks, make sure all symptoms are gone.  Lipitor not known for causing lymph node issues so if that does not clear up, see PCP.     Re challenge with 5 mg M-W-F for 2 months to ensure no return of symptoms.  Tx

## 2019-04-16 NOTE — TELEPHONE ENCOUNTER
"Mandy Lockwood R.N.   Phone Number: [phone]             MATTHEW/bunny     Pt calling to report side effects from atorvastatin.  Please call pt for details, [phone]      Pt states the first 2 weeks after starting Lipitor she experienced N/V, abdominal pain, and constipation. The 2rd week ankle and wrist pain started. Pt dropped dose to every other day on 4/8 and symptoms started to diminish, but she started noticing \"red splotches\" on her torso and an \"inflamed node\" in her neck. Advised pt to hold atorvastatin due to side effects at this time.    To Dr. Dubon--please advise.  "

## 2019-04-16 NOTE — TELEPHONE ENCOUNTER
Spoke to pt and advised of atorvastatin recommendations as per Dr. Dubon. Pt agrees to try and verbalizes understanding of instructions. Encouraged to call or MyChart w/questions or concerns.

## 2019-06-26 DIAGNOSIS — I70.213 ATHEROSCLEROSIS OF NATIVE ARTERIES OF EXTREMITIES WITH INTERMITTENT CLAUDICATION, BILATERAL LEGS (HCC): ICD-10-CM

## 2019-06-26 DIAGNOSIS — E78.2 MIXED HYPERLIPIDEMIA: ICD-10-CM

## 2019-09-15 ASSESSMENT — ENCOUNTER SYMPTOMS
TINGLING: 1
MYALGIAS: 1
HEARTBURN: 1
VOMITING: 1
DIARRHEA: 1
CONSTIPATION: 1
MEMORY LOSS: 1
BACK PAIN: 1
INSOMNIA: 1
PALPITATIONS: 1
SHORTNESS OF BREATH: 1
NAUSEA: 1
COUGH: 1
WEAKNESS: 1
BRUISES/BLEEDS EASILY: 1
ABDOMINAL PAIN: 1

## 2019-09-16 ENCOUNTER — OFFICE VISIT (OUTPATIENT)
Dept: CARDIOLOGY | Facility: PHYSICIAN GROUP | Age: 64
End: 2019-09-16
Payer: COMMERCIAL

## 2019-09-16 VITALS
SYSTOLIC BLOOD PRESSURE: 108 MMHG | OXYGEN SATURATION: 99 % | DIASTOLIC BLOOD PRESSURE: 70 MMHG | HEIGHT: 60 IN | BODY MASS INDEX: 36.91 KG/M2 | HEART RATE: 78 BPM | WEIGHT: 188 LBS

## 2019-09-16 DIAGNOSIS — E78.2 MIXED HYPERLIPIDEMIA: ICD-10-CM

## 2019-09-16 DIAGNOSIS — Z82.49 FAMILY HISTORY OF PERIPHERAL VASCULAR DISEASE: ICD-10-CM

## 2019-09-16 DIAGNOSIS — E66.9 OBESITY (BMI 35.0-39.9 WITHOUT COMORBIDITY): ICD-10-CM

## 2019-09-16 DIAGNOSIS — Z78.9 STATIN INTOLERANCE: ICD-10-CM

## 2019-09-16 DIAGNOSIS — R00.2 PALPITATIONS: ICD-10-CM

## 2019-09-16 DIAGNOSIS — I70.213 ATHEROSCLEROSIS OF NATIVE ARTERIES OF EXTREMITIES WITH INTERMITTENT CLAUDICATION, BILATERAL LEGS (HCC): ICD-10-CM

## 2019-09-16 DIAGNOSIS — I10 ESSENTIAL HYPERTENSION: ICD-10-CM

## 2019-09-16 DIAGNOSIS — R06.09 DYSPNEA ON EXERTION: ICD-10-CM

## 2019-09-16 PROCEDURE — 99213 OFFICE O/P EST LOW 20 MIN: CPT | Performed by: INTERNAL MEDICINE

## 2019-09-16 NOTE — PATIENT INSTRUCTIONS
-We now know that lack of exercise is as risky as smoking or having diabetes in terms of your heart health.  Try to perform a moderate intensity exercise which includes risk walking at least 150 minutes a week or 30 minutes for 5 days in the week.  If you are able to perform more vigorous exercise, 70 minutes/week is sufficient.    -In the future you may try you on PCSK9-Inhibitors (Repatha, Praluent) but has of yet, not clear what the benefit would be.  We have a pharmacist in [place] who can get this arranged.

## 2019-09-16 NOTE — LETTER
[facility] [address]  Phone: [phone]  Fax: [phone]              Yesenia Huff  1955    Encounter Date: 2019    Clary Dubon M.D.          PROGRESS NOTE:  Subjective:   Chief Complaint:   Chief Complaint   Patient presents with   • Hypertension       Yesenia Huff is a 64 y.o. female who returns for URIBE, palpitations, HLP and statin intolerance.     Prior dyspnea on exertion, appeared noncardiac, has not been a problem.    She reported shortness of breath for 1 week associated with palpitations. Her evaluation was unremarkable.   She has had palpitations since childhood. She has been on metoprolol and they are not bothersome.     Had CP, lasted about 1.5 days,  Radiated across the chest, not related to activity. Does not sound cardiac.    She wakes up short of breath during the night. She states it wakes her up is that she can breathe through her nose.  This stopped.  Happened more with anemia. She has been treated for seasonal allergies. She is not limited by chest pains.     She has gained a significant amount of weight over the past few years. Has been able to lose some and thinks this helped. Also, eating more heart healthy , oatmeal and fish.    Prior tobacco, quit 8 years ago, now vapes.  She has a mild cough.   Pulmonary function studies were normal except for possible restriction related to obesity.    HTN- 3 meds, no changes for a while, has been on HCTZ for many, many years.  Blood pressure has been better controlled.  Her iron deficiency anemia is related to irritable bowel syndrome and poor absorption but never had blood loss anemia. Iron panel pending.    Has fatigue.  Has insomnia.    Mother  of AAA (pt has been tested) had valve replaced, may have had HCM.    She has been on Evista which she was on after she had precancer cells in the left breast but did not require chemo or  radiation. Sees Onco, might be released sooner rather than later.  ?  Prior emergency room provider contemplated that she may have some COPD and also some sleep apnea. She was reluctant to pursue a sleep evaluation.  She did have overnight pulse oximetry with her primary care provider which was mildly abnormal.    Sxs concerning for PAD but LE arterial US 3-2019 without PAD.    Last LDL cholesterol 138 in March 2018, 135 6-2019.  Trialed lipitor 10 mg daily, reported enlarged lymph nodes, did holiday then tried 5 mg MWF, could not take it, felt poorly.    Prior meds:  Mevacor  Pravastatin  Fenofibrate  Lipitor 5 mg MWF  ?  DATA REVIEWED by me:  ECG 5/6/2018, sinus rhythm, rate 76, borderline left axis    Echo 5/4/2018 LVEF of 70%, mildly dilated left atrium, mild mitral annular calcification, RVSP 25 mmHg  ?  Nuclear stress test 3/28/2014 no evidence of ischemia or infarct, EF 62%    LE arterial US 3-28-19   No PAD.  ?  CT PE study 4/24/2018 No evidence of pulmonary embolism, splenomegaly and fatty liver noted, no mention of calcified coronary disease    PFTs 6/18/2018  IMPRESSION:  Normal examination with a suggestion of restrictive disease,   which may be related to the patient's obesity.    Holter remotely around 2008, 24 hours, we don't have results, pt said the palpitations stopped during the test    Most recent labs:   6-26-19 na 138, k 4.2, cr 0.71, lfts normal , ldl 135, hdl 44, tg 146, tc 208    3/11/2019 hemoglobin 12.7, platelets 195, glucose 99, sodium 137, potassium 4.3, creatinine 0.74, LFTs normal, total cholesterol 256, HDL 51, , triglycerides 157    April 24, 2018 hemoglobin 11, platelets 125, MCV 79, sodium 136, potassium 3.8, creatinine 0.62, LFTs normal, troponin less than 0.02,   ?  March 7, 2018 , HDL 40, triglycerides 171, total cholesterol 212, , free T4 0.82      Past Medical History:   Diagnosis Date   • Abnormal mammogram 3/16/2016   • GERD (gastroesophageal reflux  disease)    • Hiatal hernia 10/29/2015   • HTN (hypertension)    • IBS (irritable bowel syndrome)    • Insomnia    • Pneumonia    • Trigger finger of right hand 3/16/2016   • Vitamin D deficiency 2015     History reviewed. No pertinent surgical history.  Family History   Problem Relation Age of Onset   • Heart Disease Mother 75        CABG, valve surgery,  of AAA rupture, HCM   • Arrythmia Father         atrial tachycardia   • Heart Disease Maternal Uncle 50     Social History     Socioeconomic History   • Marital status: Single     Spouse name: Not on file   • Number of children: Not on file   • Years of education: Not on file   • Highest education level: Not on file   Occupational History   • Not on file   Social Needs   • Financial resource strain: Not on file   • Food insecurity:     Worry: Not on file     Inability: Not on file   • Transportation needs:     Medical: Not on file     Non-medical: Not on file   Tobacco Use   • Smoking status: Former Smoker   • Smokeless tobacco: Never Used   Substance and Sexual Activity   • Alcohol use: Yes     Alcohol/week: 0.0 oz     Comment: rarely, 3 times per year   • Drug use: No   • Sexual activity: Never   Lifestyle   • Physical activity:     Days per week: Not on file     Minutes per session: Not on file   • Stress: Not on file   Relationships   • Social connections:     Talks on phone: Not on file     Gets together: Not on file     Attends [employer]od[employer] service: Not on file     Active member of club or organization: Not on file     Attends meetings of clubs or organizations: Not on file     Relationship status: Not on file   • Intimate partner violence:     Fear of current or ex partner: Not on file     Emotionally abused: Not on file     Physically abused: Not on file     Forced sexual activity: Not on file   Other Topics Concern   • Not on file   Social History Narrative   • Not on file     Allergies   Allergen Reactions   • Statins [Hmg-Coa-R Inhibitors]  Myalgia   • Azithromycin      Heart racing   • Ciprofloxacin Hcl    • Clindamycin    • Gabapentin Itching     Itching on back of head.   • Lyrica    • Pcn [Penicillins]    • Valtrex [Valacyclovir Hcl]    • Vicodin [Hydrocodone-Acetaminophen]        Current Outpatient Medications   Medication Sig Dispense Refill   • fluticasone (FLONASE) 50 MCG/ACT nasal spray SPRAY TWO SPRAYS IN EACH NOSTRIL ONCE DAILY 16 g 0   • lisinopril (PRINIVIL) 20 MG Tab TAKE ONE TABLET BY MOUTH EVERY NIGHT AT BEDTIME 90 Tab 0   • omeprazole (PRILOSEC) 20 MG delayed-release capsule TAKE ONE CAPSULE BY MOUTH TWICE A  Cap 1   • lisinopril-hydrochlorothiazide (PRINZIDE, ZESTORETIC) 20-25 MG per tablet TAKE ONE TABLET BY MOUTH DAILY 90 Tab 3   • Magnesium 300 MG Cap Take  by mouth.     • metoprolol (LOPRESSOR) 25 MG Tab Take 1 Tab by mouth 2 times a day. TAKE ONE TABLET BY MOUTH TWICE A  Tab 1   • vitamin D (CHOLECALCIFEROL) 1000 UNIT Tab Take 1,000 Units by mouth every day.       No current facility-administered medications for this visit.        Review of Systems   Constitutional: Positive for malaise/fatigue.   HENT: Positive for congestion.    Eyes: Eye pain:      Respiratory: Positive for cough and shortness of breath.    Cardiovascular: Positive for palpitations.   Gastrointestinal: Positive for abdominal pain, constipation, diarrhea, heartburn, nausea and vomiting.   Genitourinary: Positive for frequency and urgency.   Musculoskeletal: Positive for back pain, joint pain and myalgias.   Neurological: Positive for tingling and weakness.   Endo/Heme/Allergies: Positive for environmental allergies. Bruises/bleeds easily.   Psychiatric/Behavioral: Positive for memory loss. The patient has insomnia.      All others systems reviewed and negative.     Objective:     /70 (BP Location: Left arm, Patient Position: Sitting, BP Cuff Size: Adult)   Pulse 78   Ht 1.524 m (5')   Wt 85.3 kg (188 lb)   SpO2 99%  Body mass index is  36.72 kg/m².    Physical Exam   General: No acute distress. Well nourished.  HEENT: EOM grossly intact, no scleral icterus, no pharyngeal erythema.   Neck:  No JVD, no bruits, trachea midline  CVS: RRR. Normal S1, S2. 1/6 ANY RSB. No LE edema.  2+ radial pulses, 2+ DT pulses  Resp: CTAB. No wheezing or crackles/rhonchi. Normal respiratory effort.  Abdomen: Soft, NT, no raquel hepatomegaly, obese.  MSK/Ext: No clubbing or cyanosis.  Skin: Warm and dry, no rashes.  Neurological: CN III-XII grossly intact. No focal deficits.   Psych: A&O x 3, appropriate affect, good judgement    Physical exam performed today and unchanged compared to 3-18-19.      Assessment:     1. Mixed hyperlipidemia     2. Dyspnea on exertion     3. Essential hypertension     4. Palpitations     5. Statin intolerance     6. Atherosclerosis of native arteries of extremities with intermittent claudication, bilateral legs (HCC)     7. Family history of peripheral vascular disease     8. Obesity (BMI 35.0-39.9 without comorbidity)         Medical Decision Making:  Today's Assessment / Status / Plan:       -No need to treat TG level until 500-700, no fenofibrate planned  -Unable to tolerate statins, some day PCKS9-I if she has a cardiovascular event.  -BP looks good  -Cont primary prevention strategy.  -Support weight loss.  -Does not need ASA at this point  -She would like to see cardiology once a year given FH    Written instructions given today:    -We now know that lack of exercise is as risky as smoking or having diabetes in terms of your heart health.  Try to perform a moderate intensity exercise which includes risk walking at least 150 minutes a week or 30 minutes for 5 days in the week.  If you are able to perform more vigorous exercise, 70 minutes/week is sufficient.    -In the future you may try you on PCSK9-Inhibitors (Repatha, Praluent) but has of yet, not clear what the benefit would be.  We have a pharmacist in [place] who can get this  arranged.        Return in about 1 year (around 9/16/2020), or [facility] FU with Jagdish.    It is my pleasure to participate in the care of Ms. Huff.  Please do not hesitate to contact me with questions or concerns.    Clary Dubon MD, [facility]  Cardiologist [facility]    Please note that this dictation was created using voice recognition software. I have made every reasonable attempt to correct obvious errors, but it is possible there are errors of grammar and possibly content that I did not discover before finalizing the note.      Sloan Moyer M.D.  [address] [address]  VIA Facsimile: [phone]

## 2019-09-16 NOTE — PROGRESS NOTES
Subjective:   Chief Complaint:   Chief Complaint   Patient presents with   • Hypertension       Yesenia Huff is a 64 y.o. female who returns for URIBE, palpitations, HLP and statin intolerance.     Prior dyspnea on exertion, appeared noncardiac, has not been a problem.    She reported shortness of breath for 1 week associated with palpitations. Her evaluation was unremarkable.   She has had palpitations since childhood. She has been on metoprolol and they are not bothersome.     Had CP, lasted about 1.5 days,  Radiated across the chest, not related to activity. Does not sound cardiac.    She wakes up short of breath during the night. She states it wakes her up is that she can breathe through her nose.  This stopped.  Happened more with anemia. She has been treated for seasonal allergies. She is not limited by chest pains.     She has gained a significant amount of weight over the past few years. Has been able to lose some and thinks this helped. Also, eating more heart healthy , oatmeal and fish.    Prior tobacco, quit 8 years ago, now vapes.  She has a mild cough.   Pulmonary function studies were normal except for possible restriction related to obesity.    HTN- 3 meds, no changes for a while, has been on HCTZ for many, many years.  Blood pressure has been better controlled.  Her iron deficiency anemia is related to irritable bowel syndrome and poor absorption but never had blood loss anemia. Iron panel pending.    Has fatigue.  Has insomnia.    Mother  of AAA (pt has been tested) had valve replaced, may have had HCM.    She has been on Evista which she was on after she had precancer cells in the left breast but did not require chemo or radiation. Sees Onco, might be released sooner rather than later.  ?  Prior emergency room provider contemplated that she may have some COPD and also some sleep apnea. She was reluctant to pursue a sleep evaluation.  She did have overnight pulse oximetry with her primary  care provider which was mildly abnormal.    Sxs concerning for PAD but LE arterial US 3-2019 without PAD.    Last LDL cholesterol 138 in March 2018, 135 6-2019.  Trialed lipitor 10 mg daily, reported enlarged lymph nodes, did holiday then tried 5 mg MWF, could not take it, felt poorly.    Prior meds:  Mevacor  Pravastatin  Fenofibrate  Lipitor 5 mg MWF  ?  DATA REVIEWED by me:  ECG 5/6/2018, sinus rhythm, rate 76, borderline left axis    Echo 5/4/2018 LVEF of 70%, mildly dilated left atrium, mild mitral annular calcification, RVSP 25 mmHg  ?  Nuclear stress test 3/28/2014 no evidence of ischemia or infarct, EF 62%    LE arterial US 3-28-19   No PAD.  ?  CT PE study 4/24/2018 No evidence of pulmonary embolism, splenomegaly and fatty liver noted, no mention of calcified coronary disease    PFTs 6/18/2018  IMPRESSION:  Normal examination with a suggestion of restrictive disease,   which may be related to the patient's obesity.    Holter remotely around 2008, 24 hours, we don't have results, pt said the palpitations stopped during the test    Most recent labs:   6-26-19 na 138, k 4.2, cr 0.71, lfts normal , ldl 135, hdl 44, tg 146, tc 208    3/11/2019 hemoglobin 12.7, platelets 195, glucose 99, sodium 137, potassium 4.3, creatinine 0.74, LFTs normal, total cholesterol 256, HDL 51, , triglycerides 157    April 24, 2018 hemoglobin 11, platelets 125, MCV 79, sodium 136, potassium 3.8, creatinine 0.62, LFTs normal, troponin less than 0.02,   ?  March 7, 2018 , HDL 40, triglycerides 171, total cholesterol 212, , free T4 0.82      Past Medical History:   Diagnosis Date   • Abnormal mammogram 3/16/2016   • GERD (gastroesophageal reflux disease)    • Hiatal hernia 10/29/2015   • HTN (hypertension)    • IBS (irritable bowel syndrome)    • Insomnia    • Pneumonia    • Trigger finger of right hand 3/16/2016   • Vitamin D deficiency 11/18/2015     History reviewed. No pertinent surgical history.  Family History    Problem Relation Age of Onset   • Heart Disease Mother 75        CABG, valve surgery,  of AAA rupture, HCM   • Arrythmia Father         atrial tachycardia   • Heart Disease Maternal Uncle 50     Social History     Socioeconomic History   • Marital status: Single     Spouse name: Not on file   • Number of children: Not on file   • Years of education: Not on file   • Highest education level: Not on file   Occupational History   • Not on file   Social Needs   • Financial resource strain: Not on file   • Food insecurity:     Worry: Not on file     Inability: Not on file   • Transportation needs:     Medical: Not on file     Non-medical: Not on file   Tobacco Use   • Smoking status: Former Smoker   • Smokeless tobacco: Never Used   Substance and Sexual Activity   • Alcohol use: Yes     Alcohol/week: 0.0 oz     Comment: rarely, 3 times per year   • Drug use: No   • Sexual activity: Never   Lifestyle   • Physical activity:     Days per week: Not on file     Minutes per session: Not on file   • Stress: Not on file   Relationships   • Social connections:     Talks on phone: Not on file     Gets together: Not on file     Attends Confucianism service: Not on file     Active member of club or organization: Not on file     Attends meetings of clubs or organizations: Not on file     Relationship status: Not on file   • Intimate partner violence:     Fear of current or ex partner: Not on file     Emotionally abused: Not on file     Physically abused: Not on file     Forced sexual activity: Not on file   Other Topics Concern   • Not on file   Social History Narrative   • Not on file     Allergies   Allergen Reactions   • Statins [Hmg-Coa-R Inhibitors] Myalgia   • Azithromycin      Heart racing   • Ciprofloxacin Hcl    • Clindamycin    • Gabapentin Itching     Itching on back of head.   • Lyrica    • Pcn [Penicillins]    • Valtrex [Valacyclovir Hcl]    • Vicodin [Hydrocodone-Acetaminophen]        Current Outpatient Medications    Medication Sig Dispense Refill   • fluticasone (FLONASE) 50 MCG/ACT nasal spray SPRAY TWO SPRAYS IN EACH NOSTRIL ONCE DAILY 16 g 0   • lisinopril (PRINIVIL) 20 MG Tab TAKE ONE TABLET BY MOUTH EVERY NIGHT AT BEDTIME 90 Tab 0   • omeprazole (PRILOSEC) 20 MG delayed-release capsule TAKE ONE CAPSULE BY MOUTH TWICE A  Cap 1   • lisinopril-hydrochlorothiazide (PRINZIDE, ZESTORETIC) 20-25 MG per tablet TAKE ONE TABLET BY MOUTH DAILY 90 Tab 3   • Magnesium 300 MG Cap Take  by mouth.     • metoprolol (LOPRESSOR) 25 MG Tab Take 1 Tab by mouth 2 times a day. TAKE ONE TABLET BY MOUTH TWICE A  Tab 1   • vitamin D (CHOLECALCIFEROL) 1000 UNIT Tab Take 1,000 Units by mouth every day.       No current facility-administered medications for this visit.        Review of Systems   Constitutional: Positive for malaise/fatigue.   HENT: Positive for congestion.    Eyes: Eye pain:      Respiratory: Positive for cough and shortness of breath.    Cardiovascular: Positive for palpitations.   Gastrointestinal: Positive for abdominal pain, constipation, diarrhea, heartburn, nausea and vomiting.   Genitourinary: Positive for frequency and urgency.   Musculoskeletal: Positive for back pain, joint pain and myalgias.   Neurological: Positive for tingling and weakness.   Endo/Heme/Allergies: Positive for environmental allergies. Bruises/bleeds easily.   Psychiatric/Behavioral: Positive for memory loss. The patient has insomnia.      All others systems reviewed and negative.     Objective:     /70 (BP Location: Left arm, Patient Position: Sitting, BP Cuff Size: Adult)   Pulse 78   Ht 1.524 m (5')   Wt 85.3 kg (188 lb)   SpO2 99%  Body mass index is 36.72 kg/m².    Physical Exam   General: No acute distress. Well nourished.  HEENT: EOM grossly intact, no scleral icterus, no pharyngeal erythema.   Neck:  No JVD, no bruits, trachea midline  CVS: RRR. Normal S1, S2. 1/6 ANY RSB. No LE edema.  2+ radial pulses, 2+ DT pulses  Resp:  CTAB. No wheezing or crackles/rhonchi. Normal respiratory effort.  Abdomen: Soft, NT, no arquel hepatomegaly, obese.  MSK/Ext: No clubbing or cyanosis.  Skin: Warm and dry, no rashes.  Neurological: CN III-XII grossly intact. No focal deficits.   Psych: A&O x 3, appropriate affect, good judgement    Physical exam performed today and unchanged compared to 3-18-19.      Assessment:     1. Mixed hyperlipidemia     2. Dyspnea on exertion     3. Essential hypertension     4. Palpitations     5. Statin intolerance     6. Atherosclerosis of native arteries of extremities with intermittent claudication, bilateral legs (HCC)     7. Family history of peripheral vascular disease     8. Obesity (BMI 35.0-39.9 without comorbidity)         Medical Decision Making:  Today's Assessment / Status / Plan:       -No need to treat TG level until 500-700, no fenofibrate planned  -Unable to tolerate statins, some day PCKS9-I if she has a cardiovascular event.  -BP looks good  -Cont primary prevention strategy.  -Support weight loss.  -Does not need ASA at this point  -She would like to see cardiology once a year given FH    Written instructions given today:    -We now know that lack of exercise is as risky as smoking or having diabetes in terms of your heart health.  Try to perform a moderate intensity exercise which includes risk walking at least 150 minutes a week or 30 minutes for 5 days in the week.  If you are able to perform more vigorous exercise, 70 minutes/week is sufficient.    -In the future you may try you on PCSK9-Inhibitors (Repatha, Praluent) but has of yet, not clear what the benefit would be.  We have a pharmacist in [place] who can get this arranged.        Return in about 1 year (around 9/16/2020), or [facility] FU with Jagdish.    It is my pleasure to participate in the care of Ms. Huff.  Please do not hesitate to contact me with questions or concerns.    Clary Dubon MD, [facility]  Cardiologist [place]  [facility]    Please note that this dictation was created using voice recognition software. I have made every reasonable attempt to correct obvious errors, but it is possible there are errors of grammar and possibly content that I did not discover before finalizing the note.

## 2020-01-03 ENCOUNTER — TELEPHONE (OUTPATIENT)
Dept: CARDIOLOGY | Facility: MEDICAL CENTER | Age: 65
End: 2020-01-03

## 2020-01-03 DIAGNOSIS — Z78.9 STATIN INTOLERANCE: ICD-10-CM

## 2020-01-03 DIAGNOSIS — E78.2 MIXED HYPERLIPIDEMIA: ICD-10-CM

## 2020-01-03 NOTE — TELEPHONE ENCOUNTER
Attempted to contact patient, no answer. Left detailed message.  Advised to call back with questions.     Referral to lipid clinic - [facility] Franc[facility] initiated

## 2020-01-03 NOTE — TELEPHONE ENCOUNTER
Yesenia Huff Laura E J, M.D. 10 days ago        Hi Dr Dubon and Iza Ribeiro.   I just had an x-ray on my lumbar area I'm going to  for lower back pain. Anyway I had it done at [place] if you wanted to access the result but it showed that I have moderate atherosclerosis in my aorta. This is really concerning to me since my mother  from a AAA and I know this type of issue can lead to an aneurysm. I have a lot of problems trying to manage my cholesterol on my own. My question is can I please go back on the Lipitor 15 mg a week? I will deal with the leg pains. I'd rather do that than be scared to death literally.   Thank you for your consideration   Yesenia Huff      Contacted patient, discussed the above [employer] message.      Last labs in [facility] from 19     Xray results requested from [facility]    Due to patient's family history and cholesterol control, patient is concerned about the incidental findings on the Xray.  She is requesting for further investigation.     To LS - would you like to order a CT, also please advise on if patient should be on PCSK9?

## 2020-01-03 NOTE — TELEPHONE ENCOUNTER
Plse let her know that even though there is atherosclerosis, which is vascular disease and the treatment in cholesterol control, she did not have an aneurysm there which is enlargement of the vessel- I actually have this in my note that she has been tested and does not have what her mother had so plse reassure her I'm tracking it.    I agree with LDL control, best way is PCSK9-I with [facility] pharmacist, Carolina Filter in [place] and also taking even crestor 5 mg once a week.  I don't want her to have pain. If she is agreeable, please send referral to Carolina and Rx for crestor, have her report back if any muscle pain.    Tx!

## 2020-04-06 ENCOUNTER — OFFICE VISIT (OUTPATIENT)
Dept: URGENT CARE | Facility: PHYSICIAN GROUP | Age: 65
End: 2020-04-06
Payer: COMMERCIAL

## 2020-04-06 ENCOUNTER — HOSPITAL ENCOUNTER (OUTPATIENT)
Facility: MEDICAL CENTER | Age: 65
End: 2020-04-06
Attending: PHYSICIAN ASSISTANT
Payer: COMMERCIAL

## 2020-04-06 VITALS
HEIGHT: 60 IN | OXYGEN SATURATION: 98 % | RESPIRATION RATE: 16 BRPM | DIASTOLIC BLOOD PRESSURE: 82 MMHG | SYSTOLIC BLOOD PRESSURE: 140 MMHG | BODY MASS INDEX: 38.09 KG/M2 | TEMPERATURE: 97.5 F | HEART RATE: 88 BPM | WEIGHT: 194 LBS

## 2020-04-06 DIAGNOSIS — R30.0 DYSURIA: ICD-10-CM

## 2020-04-06 DIAGNOSIS — R35.0 URINARY FREQUENCY: ICD-10-CM

## 2020-04-06 PROBLEM — K76.0 STEATOSIS OF LIVER: Status: ACTIVE | Noted: 2020-04-06

## 2020-04-06 PROBLEM — K21.9 GASTROESOPHAGEAL REFLUX DISEASE: Status: ACTIVE | Noted: 2017-01-12

## 2020-04-06 PROBLEM — M19.90 ARTHRITIS: Status: ACTIVE | Noted: 2020-04-06

## 2020-04-06 PROBLEM — H81.10 BENIGN PAROXYSMAL POSITIONAL VERTIGO: Status: ACTIVE | Noted: 2020-04-06

## 2020-04-06 PROBLEM — J30.9 ALLERGIC RHINITIS: Status: ACTIVE | Noted: 2017-09-12

## 2020-04-06 LAB
APPEARANCE UR: CLEAR
BILIRUB UR STRIP-MCNC: NORMAL MG/DL
COLOR UR AUTO: NORMAL
GLUCOSE UR STRIP.AUTO-MCNC: NORMAL MG/DL
KETONES UR STRIP.AUTO-MCNC: NORMAL MG/DL
LEUKOCYTE ESTERASE UR QL STRIP.AUTO: NORMAL
NITRITE UR QL STRIP.AUTO: NORMAL
PH UR STRIP.AUTO: 7.5 [PH] (ref 5–8)
PROT UR QL STRIP: NORMAL MG/DL
RBC UR QL AUTO: NORMAL
SP GR UR STRIP.AUTO: 1
UROBILINOGEN UR STRIP-MCNC: NORMAL MG/DL

## 2020-04-06 PROCEDURE — 87086 URINE CULTURE/COLONY COUNT: CPT

## 2020-04-06 PROCEDURE — 81002 URINALYSIS NONAUTO W/O SCOPE: CPT | Performed by: PHYSICIAN ASSISTANT

## 2020-04-06 PROCEDURE — 99214 OFFICE O/P EST MOD 30 MIN: CPT | Mod: 25 | Performed by: PHYSICIAN ASSISTANT

## 2020-04-06 RX ORDER — NITROFURANTOIN 25; 75 MG/1; MG/1
CAPSULE ORAL
COMMUNITY
Start: 2020-03-30

## 2020-04-06 RX ORDER — ONDANSETRON 4 MG/1
TABLET, ORALLY DISINTEGRATING ORAL
COMMUNITY
Start: 2020-03-26

## 2020-04-06 RX ORDER — PHENAZOPYRIDINE HYDROCHLORIDE 200 MG/1
200 TABLET, FILM COATED ORAL 3 TIMES DAILY
Qty: 6 TAB | Refills: 0 | Status: SHIPPED | OUTPATIENT
Start: 2020-04-06 | End: 2020-04-08

## 2020-04-06 RX ORDER — HYDROCHLOROTHIAZIDE 25 MG/1
TABLET ORAL
COMMUNITY
Start: 2020-04-06

## 2020-04-06 ASSESSMENT — FIBROSIS 4 INDEX: FIB4 SCORE: 3.19

## 2020-04-06 NOTE — PROGRESS NOTES
Chief Complaint   Patient presents with   • UTI       HISTORY OF PRESENT ILLNESS: Patient is a 64 y.o. female who presents today because she was diagnosed with a UTI about a week ago, most of her symptoms are gone away but she still has some urinary frequency and mild dysuria.  She has not been taking anything else for her symptoms but did finish her course of Macrobid.    Patient Active Problem List    Diagnosis Date Noted   • Arthritis 04/06/2020   • Steatosis of liver 04/06/2020   • Benign paroxysmal positional vertigo 04/06/2020   • Obesity (BMI 30-39.9) 09/13/2018   • Apnea 06/11/2018   • Obesity (BMI 35.0-39.9 without comorbidity) (HCC) 06/01/2018   • Splenomegaly 05/10/2018   • Insomnia 05/10/2018   • Sciatic nerve pain, left 05/10/2018   • Herpes zoster without complication 03/13/2018   • Iron deficiency anemia 03/13/2018   • Nausea 03/13/2018   • Allergic rhinitis 09/12/2017   • Gastroesophageal reflux disease 01/12/2017   • Atypical lobular hyperplasia of breast 01/11/2017   • Atypical lobular hyperplasia (ALH) of breast 12/21/2016   • Depression 05/19/2016   • Abnormal mammogram 03/16/2016   • Skin lesion 02/18/2016   • Gastroesophageal reflux disease without esophagitis 10/29/2015   • Irritable bowel syndrome 10/29/2015   • Hiatal hernia 10/29/2015   • Hypercholesterolemia 10/29/2015   • Hypertension 10/26/2010       Allergies:Statins [hmg-coa-r inhibitors]; Azithromycin; Ciprofloxacin hcl; Clindamycin; Gabapentin; Hydrocodone-acetaminophen; Lyrica; Pcn [penicillins]; Pregabalin; Valtrex [valacyclovir hcl]; and Vicodin [hydrocodone-acetaminophen]    Current Outpatient Medications Ordered in Epic   Medication Sig Dispense Refill   • VITAMIN D PO Take  by mouth.     • aspirin 81 MG tablet Take 81 mg by mouth.     • phenazopyridine (PYRIDIUM) 200 MG Tab Take 1 Tab by mouth 3 times a day for 2 days. 6 Tab 0   • ondansetron (ZOFRAN ODT) 4 MG TABLET DISPERSIBLE      • hydroCHLOROthiazide (HYDRODIURIL) 25 MG  Tab      • nitrofurantoin (MACROBID) 100 MG Cap      • fluticasone (FLONASE) 50 MCG/ACT nasal spray SPRAY TWO SPRAYS IN EACH NOSTRIL ONCE DAILY 16 g 0   • lisinopril (PRINIVIL) 20 MG Tab TAKE ONE TABLET BY MOUTH EVERY NIGHT AT BEDTIME 90 Tab 0   • omeprazole (PRILOSEC) 20 MG delayed-release capsule TAKE ONE CAPSULE BY MOUTH TWICE A  Cap 1   • lisinopril-hydrochlorothiazide (PRINZIDE, ZESTORETIC) 20-25 MG per tablet TAKE ONE TABLET BY MOUTH DAILY 90 Tab 3   • Magnesium 300 MG Cap Take  by mouth.     • metoprolol (LOPRESSOR) 25 MG Tab Take 1 Tab by mouth 2 times a day. TAKE ONE TABLET BY MOUTH TWICE A  Tab 1   • vitamin D (CHOLECALCIFEROL) 1000 UNIT Tab Take 1,000 Units by mouth every day.       No current [facility]-ordered facility-administered medications on file.        Past Medical History:   Diagnosis Date   • Abnormal mammogram 3/16/2016   • GERD (gastroesophageal reflux disease)    • Hiatal hernia 10/29/2015   • HTN (hypertension)    • IBS (irritable bowel syndrome)    • Insomnia    • Pneumonia    • Trigger finger of right hand 3/16/2016   • Vitamin D deficiency 2015       Social History     Tobacco Use   • Smoking status: Former Smoker   • Smokeless tobacco: Never Used   Substance Use Topics   • Alcohol use: Yes     Alcohol/week: 0.0 oz     Comment: rarely, 3 times per year   • Drug use: No       Family Status   Relation Name Status   • Mo     • Fa     • Sis  Alive   • Bro  Alive   • MUnc  (Not Specified)     Family History   Problem Relation Age of Onset   • Heart Disease Mother 75        CABG, valve surgery,  of AAA rupture, HCM   • Arrythmia Father         atrial tachycardia   • Heart Disease Maternal Uncle 50       ROS:  Review of Systems   Constitutional: Negative for fever, chills, weight loss and malaise/fatigue.   HENT: Negative for ear pain, nosebleeds, congestion, sore throat and neck pain.    Eyes: Negative for blurred vision.   Respiratory: Negative for cough,  sputum production, shortness of breath and wheezing.    Cardiovascular: Negative for chest pain, palpitations, orthopnea and leg swelling.   Gastrointestinal: Negative for heartburn, nausea, vomiting and abdominal pain.   Genitourinary: Positive for resolving dysuria, urgency and frequency.     Exam:  /82 (BP Location: Right arm, Patient Position: Sitting, BP Cuff Size: Adult)   Pulse 88   Temp 36.4 °C (97.5 °F) (Temporal)   Resp 16   Ht 1.524 m (5')   Wt 88 kg (194 lb)   SpO2 98%   General:  Well nourished, well developed female in NAD  Head:Normocephalic, atraumatic  Eyes: PERRLA, EOM within normal limits, no conjunctival injection, no scleral icterus, visual fields and acuity grossly intact.  Nose: Symmetrical without tenderness, no discharge.  Mouth: reasonable hygiene, no erythema exudates or tonsillar enlargement.  Neck: no masses, range of motion within normal limits, no tracheal deviation. No obvious thyroid enlargement.  , cyanosis, or edema.    Urinalysis was normal in the office today    Please note that this dictation was created using voice recognition software. I have made every reasonable attempt to correct obvious errors, but I expect that there are errors of grammar and possibly content that I did not discover before finalizing the note.    Assessment/Plan:  1. Dysuria  POCT Urinalysis    Urine Culture    phenazopyridine (PYRIDIUM) 200 MG Tab   2. Urinary frequency       Increase p.o. fluids, I will let her know when her culture result comes back followup with primary care in the next 7-10 days if not significantly improving, return to the urgent care or go to the emergency room sooner for any worsening of symptoms.

## 2020-04-09 LAB
BACTERIA UR CULT: NORMAL
SIGNIFICANT IND 70042: NORMAL
SITE SITE: NORMAL
SOURCE SOURCE: NORMAL

## 2023-09-26 NOTE — ASSESSMENT & PLAN NOTE
Lower extremity arterial ultrasound in 1 year (July '24)  Continue Eliquis   Continue Statin  Discuss Aspirin 81mg with cardiology  Continue daily dedicated walking    Call or return to office sooner with new or worsening symptoms we discussed in office today. Peripheral Artery Disease   AMBULATORY CARE:   Peripheral artery disease (PAD)  is narrow, weak, or blocked arteries. It may affect any arteries outside of your heart and brain. PAD is usually the result of a buildup of fat and cholesterol, also called plaque, along your artery walls. Inflammation, a blood clot, or abnormal cell growth could also block your arteries. PAD prevents normal blood flow to your legs and arms. You are at risk of an amputation if poor blood flow keeps wounds from healing or causes gangrene (tissue death). Without treatment, PAD can also cause a heart attack or stroke. Common symptoms include:  Mild PAD usually does not cause symptoms.  As the disease worsens over time, you may have the following:  Pain or cramps in your leg or hip while you walk    A numb, weak, or heavy feeling in your legs    Dry, scaly, red, or pale skin on your legs    Thick or brittle nails, or hair loss on your arms and legs    Foot sores that will not heal    Burning or aching in your feet and toes while resting (this may be worse when you lie down)    Call your local emergency number (911 in the [address]) if:   You have any of the following signs of a heart attack:      Squeezing, pressure, or pain in your chest    You may  also have any of the following:     Discomfort or pain in your back, neck, jaw, stomach, or arm    Shortness of breath    Nausea or vomiting    Lightheadedness or a sudden cold sweat    You have any of the following signs of a stroke:      Numbness or drooping on one side of your face     Weakness in an arm or leg    Confusion or difficulty speaking    Dizziness, a severe headache, or vision loss    Seek care immediately if:   You have sores This patient has recently been seen by cardiology as well as in the emergency department and physicians in both practices advised that she requires a sleep apnea study.  She is very resistant to completing the study in a sleep lab so is requesting an at home overnight pulse oximetry at this time.  She does have frequent nocturnal awakening and poor sleep with daytime lethargy.   or wounds that will not heal.    You notice black or discolored skin on your arm or leg. Your skin is cool to the touch. Call your doctor if:   You have leg pain when you walk 1/8 mile (200 meters) or less, even with treatment. Your legs are red, dry, or pale, even with treatment. You have questions or concerns about your condition or care. Treatment for PAD  can help reduce your risk of a heart attack, stroke, or amputation. You may need more than one of the following:  Medicines  may be given to prevent blood clots and reduce the risk of a heart attack or stroke. You may be given medicine to help prevent your PAD from getting worse. A supervised exercise program  helps you stay active in normal daily activities. Healthcare providers will help you safely walk or do strength training exercises 3 times a week for 30 to 60 minutes. You will do this for several months, then transition to walking on your own. Angioplasty  is a procedure to open your artery so blood can flow through normally. A thin tube called a catheter is used to insert a small balloon into your artery. The balloon is inflated to open your blocked artery, and then removed. A tube called a stent may be placed in your artery to hold it open. Bypass surgery  is used to make a new connection to your artery with a vein from another part of your body, or an artificial graft. The vein or graft is attached to your artery above and below your blockage. This allows blood to flow around the blocked portion of your artery. Manage and prevent PAD:   Walk for 30 to 60 minutes at least 4 times a week. Your healthcare provider may also refer you to a supervised exercise program. The program helps increase how far you can walk without pain. It also helps you stay active in normal daily activities         Do not smoke. Nicotine and other chemicals in cigarettes and cigars can worsen PAD.  They can also increase your risk for a heart attack or stroke. Ask your healthcare provider for information if you currently smoke and need help to quit. E-cigarettes or smokeless tobacco still contain nicotine. Talk to your healthcare provider before you use these products. Manage other health conditions. Take your medicines as directed and follow your healthcare provider's instructions if you have high blood pressure or high cholesterol. Perform foot care and check your blood sugar levels as directed if you have diabetes. Eat heart-healthy foods. Eat whole grains, fruits, and vegetables every day. Limit salt and high-fat foods. Ask your healthcare provider for more information on a heart healthy diet. Ask what a healthy weight is for you. Your healthcare provider can help you create a healthy weight-loss plan, if needed. Follow up with your doctor as directed:  Write down your questions so you remember to ask them during your visits. © Copyright [employer] 2023 Information is for End User's use only and may not be sold, redistributed or otherwise used for commercial purposes. The above information is an  only. It is not intended as medical advice for individual conditions or treatments. Talk to your doctor, nurse or pharmacist before following any medical regimen to see if it is safe and effective for you.

## 2024-05-02 NOTE — TELEPHONE ENCOUNTER
Last seen by PCP 6/18. Will send 3 months to pharmacy.  
Was the patient seen in the last year in this department? Yes    Does patient have an active prescription for medications requested? No     Received Request Via: Pharmacy      Pt met protocol?: Yes, OV 6/18, Lisinopril and Prinzide listed on pt med list (duplicate?)  BP Readings from Last 1 Encounters:   06/11/18 128/78           
Inguinal Hernia    WHAT YOU NEED TO KNOW:    An inguinal hernia happens when organs or abdominal tissue push through a weak spot in the abdominal wall. The abdominal wall is made of fat and muscle. It holds the intestines in place. The hernia may contain fluid, tissue from the abdomen, or part of an organ (such as an intestine).    DISCHARGE INSTRUCTIONS:    Return to the emergency department if:    You have severe abdominal pain with nausea and vomiting.    Your abdomen is larger than usual.    Your hernia gets bigger or is purple or blue.    You see blood in your bowel movements.    You feel weak, dizzy, or faint.  Contact your healthcare provider if:    You have a fever.    You have questions or concerns about your condition or care.  Medicine: You may need the following:    NSAIDs, such as ibuprofen, help decrease swelling, pain, and fever. NSAIDs can cause stomach bleeding or kidney problems in certain people. If you take blood thinner medicine, always ask your healthcare provider if NSAIDs are safe for you. Always read the medicine label and follow directions.    Take your medicine as directed. Contact your healthcare provider if you think your medicine is not helping or if you have side effects. Tell your provider if you are allergic to any medicine. Keep a list of the medicines, vitamins, and herbs you take. Include the amounts, and when and why you take them. Bring the list or the pill bottles to follow-up visits. Carry your medicine list with you in case of an emergency.  Follow up with your healthcare provider as directed: Write down your questions so you remember to ask them during your visits.    Manage your symptoms and prevent another hernia:    Do not lift anything heavy. Heavy lifting can make your hernia worse or cause another hernia. Ask your healthcare provider how much is safe for you to lift.    Drink liquids as directed. Liquids may prevent constipation and straining during a bowel movement. Ask how much liquid to drink each day and which liquids are best for you.    Eat foods high in fiber. Fiber may prevent constipation and straining during a bowel movement. Foods that contain fiber include fruits, vegetables, beans, lentils, and whole grains.    Maintain a healthy weight. If you are overweight, weight loss may prevent your hernia from getting worse. It may also prevent another hernia. Talk to your healthcare provider about exercise and how to lose weight safely if you are overweight.    Do not smoke. Nicotine and other chemicals in cigarettes and cigars can weaken the abdominal wall. This may increase your risk for another hernia. Ask your healthcare provider for information if you currently smoke and need help to quit. E-cigarettes or smokeless tobacco still contain nicotine. Talk to your healthcare provider before you use these products.

## 2025-03-13 NOTE — TELEPHONE ENCOUNTER
I left a message for Ivanna at [facility] to have the patients mammogram report from October 2017 faxed to our office. I left Ivanna with the direct fax number.   
Left voice message for yumiko to call back. Returning patient's phone call regarding Tamoxifen vs Raloxifen.   
Unable to assess